# Patient Record
Sex: FEMALE | Race: WHITE | HISPANIC OR LATINO | Employment: OTHER | URBAN - METROPOLITAN AREA
[De-identification: names, ages, dates, MRNs, and addresses within clinical notes are randomized per-mention and may not be internally consistent; named-entity substitution may affect disease eponyms.]

---

## 2017-05-31 ENCOUNTER — HOSPITAL ENCOUNTER (OUTPATIENT)
Dept: RADIOLOGY | Facility: MEDICAL CENTER | Age: 57
End: 2017-05-31
Attending: OBSTETRICS & GYNECOLOGY
Payer: COMMERCIAL

## 2017-05-31 DIAGNOSIS — Z13.820 SCREENING FOR OSTEOPOROSIS: ICD-10-CM

## 2017-05-31 DIAGNOSIS — M85.9 DISORDER OF BONE DENSITY AND STRUCTURE, UNSPECIFIED: ICD-10-CM

## 2017-05-31 PROCEDURE — 77080 DXA BONE DENSITY AXIAL: CPT

## 2017-08-16 ENCOUNTER — HOSPITAL ENCOUNTER (OUTPATIENT)
Dept: RADIOLOGY | Facility: MEDICAL CENTER | Age: 57
End: 2017-08-16
Attending: OBSTETRICS & GYNECOLOGY
Payer: COMMERCIAL

## 2017-08-16 DIAGNOSIS — Z12.31 ENCOUNTER FOR MAMMOGRAM TO ESTABLISH BASELINE MAMMOGRAM: ICD-10-CM

## 2017-08-16 PROCEDURE — 77063 BREAST TOMOSYNTHESIS BI: CPT

## 2017-10-24 ENCOUNTER — OFFICE VISIT (OUTPATIENT)
Dept: MEDICAL GROUP | Age: 57
End: 2017-10-24
Payer: COMMERCIAL

## 2017-10-24 VITALS
DIASTOLIC BLOOD PRESSURE: 64 MMHG | SYSTOLIC BLOOD PRESSURE: 112 MMHG | OXYGEN SATURATION: 97 % | HEART RATE: 70 BPM | TEMPERATURE: 98.4 F | WEIGHT: 163 LBS | RESPIRATION RATE: 12 BRPM | BODY MASS INDEX: 27.83 KG/M2 | HEIGHT: 64 IN

## 2017-10-24 DIAGNOSIS — M77.11 LATERAL EPICONDYLITIS OF RIGHT ELBOW: ICD-10-CM

## 2017-10-24 PROCEDURE — 99214 OFFICE O/P EST MOD 30 MIN: CPT | Performed by: INTERNAL MEDICINE

## 2017-10-24 RX ORDER — NAPROXEN 500 MG/1
500 TABLET ORAL 2 TIMES DAILY WITH MEALS
Qty: 60 TAB | Refills: 0 | Status: SHIPPED | OUTPATIENT
Start: 2017-10-24 | End: 2018-03-12

## 2017-10-24 ASSESSMENT — PAIN SCALES - GENERAL: PAINLEVEL: 5=MODERATE PAIN

## 2017-10-24 ASSESSMENT — PATIENT HEALTH QUESTIONNAIRE - PHQ9: CLINICAL INTERPRETATION OF PHQ2 SCORE: 0

## 2017-10-24 NOTE — LETTER
[facility]  Davina Grimes M.D.  [address]  [place] [address]  Fax: [phone]   Authorization for Release/Disclosure of   Protected Health Information   Name: KENA GOMEZ : 1960 SSN: [id number]   Address: [facility] [place] [address] Phone:    [phone] (home) [phone] (work)   I authorize the entity listed below to release/disclose the PHI below to:   [facility]/Davina Grimes M.D. and Davina Grimes M.D.   Provider or Entity Name:  [facility]      Address   City, State, Zip   Phone:      Fax:     Reason for request: continuity of care   Information to be released:    [x] LAST COLONOSCOPY,  including any PATH REPORT and follow-up  [  ] LAST FIT/COLOGUARD RESULT [  ] LAST DEXA  [  ] LAST MAMMOGRAM  [  ] LAST PAP  [  ] LAST LABS [  ] RETINA EXAM REPORT  [  ] IMMUNIZATION RECORDS  [  ] Release all info      [  ] Check here and initial the line next to each item to release ALL health information INCLUDING  _____ Care and treatment for drug and / or alcohol abuse  _____ HIV testing, infection status, or AIDS  _____ Genetic Testing    DATES OF SERVICE OR TIME PERIOD TO BE DISCLOSED: _____________  I understand and acknowledge that:  * This Authorization may be revoked at any time by you in writing, except if your health information has already been used or disclosed.  * Your health information that will be used or disclosed as a result of you signing this authorization could be re-disclosed by the recipient. If this occurs, your re-disclosed health information may no longer be protected by State or Federal laws.  * You may refuse to sign this Authorization. Your refusal will not affect your ability to obtain treatment.  * This Authorization becomes effective upon signing and will  on (date) __________.      If no date is indicated, this Authorization will  one (1) year from the signature date.    Name: Kena Gomez    Signature:   Date:     10/24/2017            PLEASE FAX REQUESTED RECORDS BACK TO: [phone]

## 2017-10-25 NOTE — PROGRESS NOTES
"Subjective:   Kena Gomez is a 57 y.o. female here today for evaluation and management of:      Lateral epicondylitis of right elbow  This is a new problem. Patient reported that she has pain on lateral part of the right elbow radiated to right forearm for 3 weeks. She does cardio exercise, weight lifting exercise, and pushup up regularly at gym. She did not recall any trauma or injury to right forearm. She tried icy hot and ibuprofen 1-3 caps as needed for sporadically. Her pain improved with ibuprofen but not fully resolved. She reported that her pain is 5 out of 10 in severity, dull, aching in nature.         Current medicines (including changes today)  Current Outpatient Prescriptions   Medication Sig Dispense Refill   • naproxen (NAPROSYN) 500 MG Tab Take 1 Tab by mouth 2 times a day, with meals. 60 Tab 0     No current facility-administered medications for this visit.      She  has a past medical history of Breast cancer (CMS-HCC) and Cancer (CMS-HCC) (2001).    ROS   No chest pain, no shortness of breath, no abdominal pain       Objective:     Blood pressure 112/64, pulse 70, temperature 36.9 °C (98.4 °F), resp. rate 12, height 1.626 m (5' 4\"), weight 73.9 kg (163 lb), SpO2 97 %. Body mass index is 27.98 kg/m².   Physical Exam:  General: Alert, oriented and no acute distress.  Eye contact is good, speech goal directed, affect calm  HEENT: conjunctiva non-injected, sclera non-icteric.  Oral mucous membranes pink and moist with no lesions.  Pinna normal.  Lungs: Normal respiratory effort, clear to auscultation bilaterally with good excursion.  CV: regular rate and rhythm. No murmurs.  Abdomen: soft, non distended, nontender, Bowel sound normal.  Ext: no edema, color normal, vascularity normal, temperature normal  Musculoskeletal exam: Mild tender on palpation of the lateral epicondyles. No joint effusion noticed on both elbows, range of movement of both elbows are within normal.      Assessment and Plan: "   The following treatment plan was discussed     1. Lateral epicondylitis of right elbow  - Discussed the nature of the disease with patient. We discussed to try anti-inflammation for 5-7 days. We discussed to take naproxen 500 mg twice a day with meals for 5-7 days.  - Advised to use elbow splint or brace.  - Advised to ice 20 minutes twice a day.  - Advised to avoid weight lifting exercise and pushup exercise or any repetitive movement of the right elbow until she fully recovers.  - Advised to return to clinic if her symptoms do not improve in 4-6 weeks.  - Consider physical therapy and x-ray in future if symptoms do not improve with conservative treatment.  - Advised to stop taking naproxen if she has stomach discomfort or black tarry stool or bleeding bowel movement.  - Advised to take Prilosec 20 mg daily when she is taking naproxen.  - naproxen (NAPROSYN) 500 MG Tab; Take 1 Tab by mouth 2 times a day, with meals.  Dispense: 60 Tab; Refill: 0      Followup: Return if symptoms worsen or fail to improve.      Please note that this dictation was created using voice recognition software. I have made every reasonable attempt to correct obvious errors, but I expect that there may have unintended errors in text, spelling, punctuation, or grammar that I did not discover.

## 2017-10-25 NOTE — ASSESSMENT & PLAN NOTE
This is a new problem. Patient reported that she has pain on lateral part of the right elbow radiated to right forearm for 3 weeks. She does cardio exercise, weight lifting exercise, and pushup up regularly at gym. She did not recall any trauma or injury to right forearm. She tried icy hot and ibuprofen 1-3 caps as needed for sporadically. Her pain improved with ibuprofen but not fully resolved. She reported that her pain is 5 out of 10 in severity, dull, aching in nature.

## 2018-03-12 ENCOUNTER — OFFICE VISIT (OUTPATIENT)
Dept: MEDICAL GROUP | Age: 58
End: 2018-03-12
Payer: COMMERCIAL

## 2018-03-12 VITALS
HEART RATE: 91 BPM | OXYGEN SATURATION: 95 % | HEIGHT: 64 IN | DIASTOLIC BLOOD PRESSURE: 70 MMHG | BODY MASS INDEX: 28.95 KG/M2 | WEIGHT: 169.6 LBS | SYSTOLIC BLOOD PRESSURE: 110 MMHG | TEMPERATURE: 98.2 F

## 2018-03-12 DIAGNOSIS — F32.A MILD DEPRESSION: ICD-10-CM

## 2018-03-12 DIAGNOSIS — Z23 NEED FOR TDAP VACCINATION: ICD-10-CM

## 2018-03-12 DIAGNOSIS — H61.21 IMPACTED CERUMEN OF RIGHT EAR: ICD-10-CM

## 2018-03-12 DIAGNOSIS — E78.00 PURE HYPERCHOLESTEROLEMIA: ICD-10-CM

## 2018-03-12 DIAGNOSIS — K13.0 SORE LIPS: ICD-10-CM

## 2018-03-12 PROCEDURE — 90715 TDAP VACCINE 7 YRS/> IM: CPT | Performed by: INTERNAL MEDICINE

## 2018-03-12 PROCEDURE — 90471 IMMUNIZATION ADMIN: CPT | Performed by: INTERNAL MEDICINE

## 2018-03-12 PROCEDURE — 99214 OFFICE O/P EST MOD 30 MIN: CPT | Mod: 25 | Performed by: INTERNAL MEDICINE

## 2018-03-12 ASSESSMENT — PATIENT HEALTH QUESTIONNAIRE - PHQ9
5. POOR APPETITE OR OVEREATING: 2 - MORE THAN HALF THE DAYS
CLINICAL INTERPRETATION OF PHQ2 SCORE: 4
SUM OF ALL RESPONSES TO PHQ QUESTIONS 1-9: 16

## 2018-03-13 NOTE — ASSESSMENT & PLAN NOTE
Patient still has depressed mood. She has mild depressed mood for all her life. She has never been treated with medication. She declines suicidal ideation or plan. I will reset ideation or plan. Patient refused to take medication. She agreed to refer to psychologist. She stated that she still enjoys doing her daily activity. 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 She has good relationship with her , friends, family, and coworkers.

## 2018-03-13 NOTE — ASSESSMENT & PLAN NOTE
This is a new problem. Patient reported that she has sore on right lower lip last week. She stated that the sore is similar to pimple. She had swollen on right lower lip last week. She stated that she did not have burning or itchiness. She stated that she had core sore before and patient is not similar to cold sore. Lesion gradually improved and swelling resolved. She does not have any pain or discomfort on exam. She agreed to conservative treatment and continue to monitor. She does not have fever, chills, cough or sore throat or lesion inside oral cavity.

## 2018-03-13 NOTE — ASSESSMENT & PLAN NOTE
Patient reports that she feels full in the right side of the ear as she does not have pain, or discharge from ear. She has history of cerumen impacted and she wants to check her ear in clinic. She also requests to remove her ear wax with saline lavage in clinic.

## 2018-03-13 NOTE — PROGRESS NOTES
Subjective:   Gifty Gomez is a 57 y.o. female here today for evaluation and management of:      Sore lips  This is a new problem. Patient reported that she has sore on right lower lip last week. She stated that the sore is similar to pimple. She had swollen on right lower lip last week. She stated that she did not have burning or itchiness. She stated that she had core sore before and patient is not similar to cold sore. Lesion gradually improved and swelling resolved. She does not have any pain or discomfort on exam. She agreed to conservative treatment and continue to monitor. She does not have fever, chills, cough or sore throat or lesion inside oral cavity.    Pure hypercholesterolemia  Patient has history of high cholesterol. She stated that she has not been compliance with diet and exercise. She started exercising last week. She is advised to continue regular physical exercise at least 3-5 times a week. We also discussed healthy diet as well. Patient wanted to control her cholesterol with diet and exercise and declined to take medication.    Results for GIFTY GOMEZ (MRN [id number]) as of 3/12/2018 20:03   Ref. Range 3/19/2016 07:57   Cholesterol,Tot Latest Ref Range: 100 - 199 mg/dL 286 (H)   Triglycerides Latest Ref Range: 0 - 149 mg/dL 71   HDL Latest Ref Range: >=40 mg/dL 80   LDL Latest Ref Range: <100 mg/dL 192 (H)       Mild depression  Patient still has depressed mood. She has mild depressed mood for all her life. She has never been treated with medication. She declines suicidal ideation or plan. I will reset ideation or plan. Patient refused to take medication. She agreed to refer to psychologist. She stated that she still enjoys doing her daily activity. 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 She has good relationship with her , friends, family, and coworkers.    Impacted cerumen of right ear  Patient reports that she feels full in the right side of the ear as she does  "not have pain, or discharge from ear. She has history of cerumen impacted and she wants to check her ear in clinic. She also requests to remove her ear wax with saline lavage in clinic.         Current medicines (including changes today)  No current outpatient prescriptions on file.     No current facility-administered medications for this visit.      She  has a past medical history of Breast cancer (CMS-HCC) and Cancer (CMS-HCC) (2001).    ROS   No chest pain, no shortness of breath, no abdominal pain       Objective:     Blood pressure 110/70, pulse 91, temperature 36.8 °C (98.2 °F), height 1.626 m (5' 4\"), weight 76.9 kg (169 lb 9.6 oz), SpO2 95 %. Body mass index is 29.11 kg/m².   Physical Exam:  General: Alert, oriented and no acute distress.  Eye contact is good, speech goal directed, affect calm  HEENT: conjunctiva non-injected, sclera non-icteric.  Oral mucous membranes pink and moist with no lesions.  Pinna normal. TM pearly gray. Moderate cerumen in right side of the ER and mild earwax on left ear. No signs of ear infection.   Neck No supraclavicular, submandibular, submental lymphadenopathy or masses in the neck or supraclavicular regions.  Lungs: Normal respiratory effort, clear to auscultation bilaterally with good excursion.  CV: regular rate and rhythm. No murmurs.   Abdomen: soft, non distended, nontender, Bowel sound normal.  Ext: no edema, color normal, vascularity normal, temperature normal      Assessment and Plan:   The following treatment plan was discussed     1. Impacted cerumen of right ear  - Lavage right ear with saline in clinic. There was not much cerumen removed from lavage.  - Patient is advised to try rinsing this with saline and use Flonase nasal spray to decrease pressure and eustachian tube and see that might help to decrease pressure on right ear.  - We also discussed to try nasal steam treatment.  - Patient can try over-the-counter Debrox ear drops to dissolve ear wax.  - She is " advised to return to clinic if her symptoms do not improve with above conservative treatment. Patient understand and agree with the plan.    2. Sore lips  - Improved and swelling resolved. There is no other active oral cavity lesion or lesion on lips.  Provided reassurance.  - Patient agreed to continue to monitor and continue conservative treatment.    3. Pure hypercholesterolemia  - Not on medication. Patient declined to be treated with medication in the past.  - Advised to eat low fat, low carbohydrate and high fiber diet as well as do cardio physical exercise regularly.   - Recheck lab in 3 months.  - COMP METABOLIC PANEL; Future  - LIPID PROFILE; Future    4. Mild depression (CMS-HCC)  - Chronic and stable. Patient refused to take medication. Patient agreed to refer to psychologist for cognitive behavioral treatment.  - REFERRAL TO PSYCHOLOGY  - CBC WITH DIFFERENTIAL; Future  - COMP METABOLIC PANEL; Future    5. Need for Tdap vaccination  - Tdap vaccine was given today after reviewing risks and benefits as well as side effects of vaccine.  - TDAP VACCINE =>8YO IM    Face-to-face time spent 30 minutes with patient and more than half of that time spent for counseling and cooperating of care for medical problems listed above.       Followup: Return in about 6 months (around 9/12/2018), or if symptoms worsen or fail to improve, for hypercholesterolemia, mild depressed, lab review.      Please note that this dictation was created using voice recognition software. I have made every reasonable attempt to correct obvious errors, but I expect that there may have unintended errors in text, spelling, punctuation, or grammar that I did not discover.

## 2018-03-13 NOTE — ASSESSMENT & PLAN NOTE
Patient has history of high cholesterol. She stated that she has not been compliance with diet and exercise. She started exercising last week. She is advised to continue regular physical exercise at least 3-5 times a week. We also discussed healthy diet as well. Patient wanted to control her cholesterol with diet and exercise and declined to take medication.    Results for GIFTY FERREIRA (MRN [id number]) as of 3/12/2018 20:03   Ref. Range 3/19/2016 07:57   Cholesterol,Tot Latest Ref Range: 100 - 199 mg/dL 286 (H)   Triglycerides Latest Ref Range: 0 - 149 mg/dL 71   HDL Latest Ref Range: >=40 mg/dL 80   LDL Latest Ref Range: <100 mg/dL 192 (H)

## 2018-06-01 ENCOUNTER — APPOINTMENT (OUTPATIENT)
Dept: MEDICAL GROUP | Age: 58
End: 2018-06-01
Payer: COMMERCIAL

## 2018-06-09 ENCOUNTER — HOSPITAL ENCOUNTER (OUTPATIENT)
Dept: LAB | Facility: MEDICAL CENTER | Age: 58
End: 2018-06-09
Attending: INTERNAL MEDICINE
Payer: COMMERCIAL

## 2018-06-09 DIAGNOSIS — F32.A MILD DEPRESSION: ICD-10-CM

## 2018-06-09 DIAGNOSIS — E78.00 PURE HYPERCHOLESTEROLEMIA: ICD-10-CM

## 2018-06-09 LAB
ALBUMIN SERPL BCP-MCNC: 3.8 G/DL (ref 3.2–4.9)
ALBUMIN/GLOB SERPL: 1.3 G/DL
ALP SERPL-CCNC: 55 U/L (ref 30–99)
ALT SERPL-CCNC: 11 U/L (ref 2–50)
ANION GAP SERPL CALC-SCNC: 5 MMOL/L (ref 0–11.9)
AST SERPL-CCNC: 12 U/L (ref 12–45)
BASOPHILS # BLD AUTO: 1.3 % (ref 0–1.8)
BASOPHILS # BLD: 0.05 K/UL (ref 0–0.12)
BILIRUB SERPL-MCNC: 1.1 MG/DL (ref 0.1–1.5)
BUN SERPL-MCNC: 18 MG/DL (ref 8–22)
CALCIUM SERPL-MCNC: 8.8 MG/DL (ref 8.5–10.5)
CHLORIDE SERPL-SCNC: 107 MMOL/L (ref 96–112)
CHOLEST SERPL-MCNC: 273 MG/DL (ref 100–199)
CO2 SERPL-SCNC: 27 MMOL/L (ref 20–33)
CREAT SERPL-MCNC: 0.64 MG/DL (ref 0.5–1.4)
EOSINOPHIL # BLD AUTO: 0.14 K/UL (ref 0–0.51)
EOSINOPHIL NFR BLD: 3.6 % (ref 0–6.9)
ERYTHROCYTE [DISTWIDTH] IN BLOOD BY AUTOMATED COUNT: 43.2 FL (ref 35.9–50)
GLOBULIN SER CALC-MCNC: 3 G/DL (ref 1.9–3.5)
GLUCOSE SERPL-MCNC: 94 MG/DL (ref 65–99)
HCT VFR BLD AUTO: 42.7 % (ref 37–47)
HDLC SERPL-MCNC: 61 MG/DL
HGB BLD-MCNC: 13.8 G/DL (ref 12–16)
IMM GRANULOCYTES # BLD AUTO: 0.01 K/UL (ref 0–0.11)
IMM GRANULOCYTES NFR BLD AUTO: 0.3 % (ref 0–0.9)
LDLC SERPL CALC-MCNC: 190 MG/DL
LYMPHOCYTES # BLD AUTO: 1.21 K/UL (ref 1–4.8)
LYMPHOCYTES NFR BLD: 30.9 % (ref 22–41)
MCH RBC QN AUTO: 28.8 PG (ref 27–33)
MCHC RBC AUTO-ENTMCNC: 32.3 G/DL (ref 33.6–35)
MCV RBC AUTO: 89.1 FL (ref 81.4–97.8)
MONOCYTES # BLD AUTO: 0.27 K/UL (ref 0–0.85)
MONOCYTES NFR BLD AUTO: 6.9 % (ref 0–13.4)
NEUTROPHILS # BLD AUTO: 2.24 K/UL (ref 2–7.15)
NEUTROPHILS NFR BLD: 57 % (ref 44–72)
NRBC # BLD AUTO: 0 K/UL
NRBC BLD-RTO: 0 /100 WBC
PLATELET # BLD AUTO: 226 K/UL (ref 164–446)
PMV BLD AUTO: 9.8 FL (ref 9–12.9)
POTASSIUM SERPL-SCNC: 4.2 MMOL/L (ref 3.6–5.5)
PROT SERPL-MCNC: 6.8 G/DL (ref 6–8.2)
RBC # BLD AUTO: 4.79 M/UL (ref 4.2–5.4)
SODIUM SERPL-SCNC: 139 MMOL/L (ref 135–145)
TRIGL SERPL-MCNC: 110 MG/DL (ref 0–149)
WBC # BLD AUTO: 3.9 K/UL (ref 4.8–10.8)

## 2018-06-09 PROCEDURE — 80053 COMPREHEN METABOLIC PANEL: CPT

## 2018-06-09 PROCEDURE — 85025 COMPLETE CBC W/AUTO DIFF WBC: CPT

## 2018-06-09 PROCEDURE — 36415 COLL VENOUS BLD VENIPUNCTURE: CPT

## 2018-06-09 PROCEDURE — 80061 LIPID PANEL: CPT

## 2018-06-18 ENCOUNTER — OFFICE VISIT (OUTPATIENT)
Dept: MEDICAL GROUP | Age: 58
End: 2018-06-18
Payer: COMMERCIAL

## 2018-06-18 VITALS
SYSTOLIC BLOOD PRESSURE: 108 MMHG | HEART RATE: 83 BPM | WEIGHT: 167.4 LBS | HEIGHT: 64 IN | DIASTOLIC BLOOD PRESSURE: 72 MMHG | OXYGEN SATURATION: 95 % | TEMPERATURE: 98 F | BODY MASS INDEX: 28.58 KG/M2

## 2018-06-18 DIAGNOSIS — Z12.31 VISIT FOR SCREENING MAMMOGRAM: ICD-10-CM

## 2018-06-18 DIAGNOSIS — E78.00 PURE HYPERCHOLESTEROLEMIA: ICD-10-CM

## 2018-06-18 DIAGNOSIS — B07.8 OTHER VIRAL WARTS: ICD-10-CM

## 2018-06-18 DIAGNOSIS — F32.A MILD DEPRESSION: ICD-10-CM

## 2018-06-18 PROBLEM — H61.21 IMPACTED CERUMEN OF RIGHT EAR: Status: RESOLVED | Noted: 2018-03-12 | Resolved: 2018-06-18

## 2018-06-18 PROCEDURE — 17110 DESTRUCTION B9 LES UP TO 14: CPT | Performed by: INTERNAL MEDICINE

## 2018-06-18 PROCEDURE — 99214 OFFICE O/P EST MOD 30 MIN: CPT | Mod: 25 | Performed by: INTERNAL MEDICINE

## 2018-06-18 RX ORDER — SIMVASTATIN 10 MG
10 TABLET ORAL EVERY EVENING
Qty: 90 TAB | Refills: 3 | Status: SHIPPED | OUTPATIENT
Start: 2018-06-18 | End: 2018-09-19 | Stop reason: SDUPTHER

## 2018-06-19 NOTE — ASSESSMENT & PLAN NOTE
Patient states that her mood is stable.  She does not want to take any depression medication.  She tries to do regular physical exercise to keep her mood active.  She does not want to see a psychiatrist or psychologist.  She denies suicidal ideation or plan.  She states that she has good relationship with her family, friends and coworkers.

## 2018-06-19 NOTE — ASSESSMENT & PLAN NOTE
Patient reported that she has warts on left palm for about 10 years.  She was treated with topical cream by dermatologist last year without improvement.  She did not recall the name of the cream.  She wants to try cryotherapy.

## 2018-06-19 NOTE — ASSESSMENT & PLAN NOTE
Patient states that she is healthy diet and does regular physical exercise 4 times a week.  Her cholesterol is not well controlled.  Lipid panel showed still has elevated total cholesterol and LDL cholesterol.  She has never been treated with medication for her cholesterol before.  I reviewed the risks and benefits of statin treatment and potential side effects of simvastatin with patient.  Patient agreed to try simvastatin 10 mg every evening.  Patient also reported that she likes to eat cheese and different kind of nuts daily.  We discussed to cut down eating cheese and nuts.      Results for GIFTY FERREIRA (MRN [id number]) as of 6/18/2018 17:41   Ref. Range 3/19/2016 07:57 6/9/2018 08:15   Cholesterol,Tot Latest Ref Range: 100 - 199 mg/dL 286 (H) 273 (H)   Triglycerides Latest Ref Range: 0 - 149 mg/dL 71 110   HDL Latest Ref Range: >=40 mg/dL 80 61   LDL Latest Ref Range: <100 mg/dL 192 (H) 190 (H)

## 2018-08-20 ENCOUNTER — HOSPITAL ENCOUNTER (OUTPATIENT)
Dept: RADIOLOGY | Facility: MEDICAL CENTER | Age: 58
End: 2018-08-20
Attending: INTERNAL MEDICINE
Payer: COMMERCIAL

## 2018-08-20 DIAGNOSIS — Z12.31 VISIT FOR SCREENING MAMMOGRAM: ICD-10-CM

## 2018-08-20 PROCEDURE — 77067 SCR MAMMO BI INCL CAD: CPT

## 2018-09-15 ENCOUNTER — HOSPITAL ENCOUNTER (OUTPATIENT)
Dept: LAB | Facility: MEDICAL CENTER | Age: 58
End: 2018-09-15
Attending: INTERNAL MEDICINE
Payer: COMMERCIAL

## 2018-09-15 DIAGNOSIS — E78.00 PURE HYPERCHOLESTEROLEMIA: ICD-10-CM

## 2018-09-15 DIAGNOSIS — F32.A MILD DEPRESSION: ICD-10-CM

## 2018-09-15 LAB
ALBUMIN SERPL BCP-MCNC: 3.9 G/DL (ref 3.2–4.9)
ALBUMIN/GLOB SERPL: 1.3 G/DL
ALP SERPL-CCNC: 57 U/L (ref 30–99)
ALT SERPL-CCNC: 25 U/L (ref 2–50)
ANION GAP SERPL CALC-SCNC: 9 MMOL/L (ref 0–11.9)
AST SERPL-CCNC: 24 U/L (ref 12–45)
BILIRUB SERPL-MCNC: 1.1 MG/DL (ref 0.1–1.5)
BUN SERPL-MCNC: 17 MG/DL (ref 8–22)
CALCIUM SERPL-MCNC: 8.9 MG/DL (ref 8.5–10.5)
CHLORIDE SERPL-SCNC: 105 MMOL/L (ref 96–112)
CHOLEST SERPL-MCNC: 257 MG/DL (ref 100–199)
CO2 SERPL-SCNC: 27 MMOL/L (ref 20–33)
CREAT SERPL-MCNC: 0.74 MG/DL (ref 0.5–1.4)
FASTING STATUS PATIENT QL REPORTED: NORMAL
GLOBULIN SER CALC-MCNC: 3.1 G/DL (ref 1.9–3.5)
GLUCOSE SERPL-MCNC: 94 MG/DL (ref 65–99)
HDLC SERPL-MCNC: 63 MG/DL
LDLC SERPL CALC-MCNC: 167 MG/DL
POTASSIUM SERPL-SCNC: 4.4 MMOL/L (ref 3.6–5.5)
PROT SERPL-MCNC: 7 G/DL (ref 6–8.2)
SODIUM SERPL-SCNC: 141 MMOL/L (ref 135–145)
TRIGL SERPL-MCNC: 136 MG/DL (ref 0–149)

## 2018-09-15 PROCEDURE — 80053 COMPREHEN METABOLIC PANEL: CPT

## 2018-09-15 PROCEDURE — 36415 COLL VENOUS BLD VENIPUNCTURE: CPT

## 2018-09-15 PROCEDURE — 80061 LIPID PANEL: CPT

## 2018-09-19 ENCOUNTER — OFFICE VISIT (OUTPATIENT)
Dept: MEDICAL GROUP | Age: 58
End: 2018-09-19
Payer: COMMERCIAL

## 2018-09-19 VITALS
HEART RATE: 65 BPM | WEIGHT: 165.4 LBS | HEIGHT: 64 IN | TEMPERATURE: 98.9 F | BODY MASS INDEX: 28.24 KG/M2 | OXYGEN SATURATION: 96 % | DIASTOLIC BLOOD PRESSURE: 82 MMHG | SYSTOLIC BLOOD PRESSURE: 130 MMHG

## 2018-09-19 DIAGNOSIS — Z23 NEEDS FLU SHOT: ICD-10-CM

## 2018-09-19 DIAGNOSIS — F32.A MILD DEPRESSION: ICD-10-CM

## 2018-09-19 DIAGNOSIS — B07.8 OTHER VIRAL WARTS: ICD-10-CM

## 2018-09-19 DIAGNOSIS — E78.00 PURE HYPERCHOLESTEROLEMIA: ICD-10-CM

## 2018-09-19 DIAGNOSIS — D72.819 CHRONIC LEUKOPENIA: ICD-10-CM

## 2018-09-19 PROBLEM — K13.0: Status: RESOLVED | Noted: 2018-03-12 | Resolved: 2018-09-19

## 2018-09-19 PROCEDURE — 90686 IIV4 VACC NO PRSV 0.5 ML IM: CPT | Performed by: INTERNAL MEDICINE

## 2018-09-19 PROCEDURE — 90471 IMMUNIZATION ADMIN: CPT | Performed by: INTERNAL MEDICINE

## 2018-09-19 PROCEDURE — 99214 OFFICE O/P EST MOD 30 MIN: CPT | Mod: 25 | Performed by: INTERNAL MEDICINE

## 2018-09-19 RX ORDER — SIMVASTATIN 20 MG
20 TABLET ORAL EVERY EVENING
Qty: 90 TAB | Refills: 3 | Status: SHIPPED | OUTPATIENT
Start: 2018-09-19 | End: 2019-11-06 | Stop reason: SDUPTHER

## 2018-09-20 NOTE — ASSESSMENT & PLAN NOTE
Patient has low white blood cell count chronically.  Patient denied drinking alcohol heavily or daily.  She reported that she may drink alcohol 1 or 2 glasses of wine once a week at most.  She has history of left breast cancer and was treated with lumpectomy by Dr. Hoover on 10/1/13 followed by radiation therapy for 3 months.  She declined tamoxifen.  Patient was clear from [facility] Plasencia oncologist.  She has normal mammogram on 8/20/18.

## 2018-09-20 NOTE — ASSESSMENT & PLAN NOTE
Patient stated that her mood improved a lot after coming back from vacation.  [place] to [place] with her high school girl friends.  She enjoyed her trip and she felt relaxed.  She will continue to control her mood with nonpharmacological treatment.

## 2018-09-20 NOTE — ASSESSMENT & PLAN NOTE
Patient stated that she took simvastatin 10 mg every evening in the past 3 months and she stopped taking simvastatin for 2 weeks when she went to [place] for vacation.  She just went back from vacation.  She denied side effects from taking simvastatin.  Her cholesterol level improved slightly but not well-controlled yet.  We discussed to increase the dose of simvastatin to 20 mg daily.  Patient agreed with the plan.  Patient reported that she is exercising 5 days a week about 1 hour at gym.    Results for HANNAGIFTY PREET (MRN [id number]) as of 9/19/2018 19:06   Ref. Range 3/19/2016 07:57 6/9/2018 08:15 9/15/2018 07:11   Cholesterol,Tot Latest Ref Range: 100 - 199 mg/dL 286 (H) 273 (H) 257 (H)   Triglycerides Latest Ref Range: 0 - 149 mg/dL 71 110 136   HDL Latest Ref Range: >=40 mg/dL 80 61 63   LDL Latest Ref Range: <100 mg/dL 192 (H) 190 (H) 167 (H)

## 2018-09-20 NOTE — PROGRESS NOTES
Subjective:   Gifty Gomez is a 58 y.o. female here today for evaluation and management of:      Pure hypercholesterolemia  Patient stated that she took simvastatin 10 mg every evening in the past 3 months and she stopped taking simvastatin for 2 weeks when she went to [place] for vacation.  She just went back from vacation.  She denied side effects from taking simvastatin.  Her cholesterol level improved slightly but not well-controlled yet.  We discussed to increase the dose of simvastatin to 20 mg daily.  Patient agreed with the plan.  Patient reported that she is exercising 5 days a week about 1 hour at gym.    Results for GIFTY GOMEZ (MRN [id number]) as of 9/19/2018 19:06   Ref. Range 3/19/2016 07:57 6/9/2018 08:15 9/15/2018 07:11   Cholesterol,Tot Latest Ref Range: 100 - 199 mg/dL 286 (H) 273 (H) 257 (H)   Triglycerides Latest Ref Range: 0 - 149 mg/dL 71 110 136   HDL Latest Ref Range: >=40 mg/dL 80 61 63   LDL Latest Ref Range: <100 mg/dL 192 (H) 190 (H) 167 (H)       Other viral warts on left palm   She was treated with topical cream by dermatologist 10 years ago without improvement.  Patient received cryotherapy on 6/18/18.  The lesion resolved after cryotherapy.  She denied recurrent symptoms or pain.    Mild depression  Patient stated that her mood improved a lot after coming back from vacation.  [place] with her high school girl friends.  She enjoyed her trip and she felt relaxed.  She will continue to control her mood with nonpharmacological treatment.    Chronic leukopenia  Patient has low white blood cell count chronically.  Patient denied drinking alcohol heavily or daily.  She reported that she may drink alcohol 1 or 2 glasses of wine once a week at most.  She has history of left breast cancer and was treated with lumpectomy by Dr. Hoover on 10/1/13 followed by radiation therapy for 3 months.  She declined tamoxifen.  Patient was clear from [facility] Plasencia oncologist.   "She has normal mammogram on 8/20/18.         Current medicines (including changes today)  Current Outpatient Prescriptions   Medication Sig Dispense Refill   • simvastatin (ZOCOR) 20 MG Tab Take 1 Tab by mouth every evening. 90 Tab 3     No current facility-administered medications for this visit.      She  has a past medical history of Breast cancer (HCC) and Cancer (HCC) (2001).    ROS   No chest pain, no shortness of breath, no abdominal pain       Objective:     Blood pressure 130/82, pulse 65, temperature 37.2 °C (98.9 °F), temperature source Temporal, height 1.626 m (5' 4\"), weight 75 kg (165 lb 6.4 oz), SpO2 96 %. Body mass index is 28.39 kg/m².   Physical Exam:  General: Alert, oriented and no acute distress.  Eye contact is good, speech goal directed, affect calm  HEENT: conjunctiva non-injected, sclera non-icteric.  Oral mucous membranes pink and moist with no lesions.  Pinna normal.   Lungs: Normal respiratory effort, clear to auscultation bilaterally with good excursion.  CV: regular rate and rhythm. No murmurs.  Abdomen: soft, non distended, nontender, Bowel sound normal.  Ext: no edema, color normal, vascularity normal, temperature normal      Assessment and Plan:   The following treatment plan was discussed     1. Pure hypercholesterolemia  - Not well-controlled yet.  Increased the dose of simvastatin from 10 mg to 20 mg 1 tablet every evening after discussion with patient. Recheck lab 1-2 weeks before next follow up visit.  - Reviewed the risks and benefits of treatment and potential side effects of medication.  - Advised to eat low fat, low carbohydrate and high fiber diet as well as do cardio physical exercise regularly.  - simvastatin (ZOCOR) 20 MG Tab; Take 1 Tab by mouth every evening.  Dispense: 90 Tab; Refill: 3  - COMP METABOLIC PANEL; Future  - LIPID PROFILE; Future    2. Mild depression (HCC)  - Improved.  Continue to control with nonpharmacological treatment, relaxation techniques and " regular physical exercise.  Continue to monitor.  - CBC WITH DIFFERENTIAL; Future  - COMP METABOLIC PANEL; Future    3. Chronic leukopenia  - Chronic and stable.  Discussed possible causes with patient.  Patient wanted to recheck in 6 month.  - CBC WITH DIFFERENTIAL; Future    4. Other viral warts on left palm   - Resolved after cryotherapy on 6/18/18.    5. Needs flu shot  - Influenza vaccine was given today after reviewing risks and benefits as well as side effects of vaccine.  - Influenza Vaccine Quad Injection >3Y (PF)      Followup: Return in about 6 months (around 3/19/2019), or if symptoms worsen or fail to improve, for Hyperlipidemia, Depression, leukopenia, Lab review.      Please note that this dictation was created using voice recognition software. I have made every reasonable attempt to correct obvious errors, but I expect that there may have unintended errors in text, spelling, punctuation, or grammar that I did not discover.

## 2018-09-20 NOTE — ASSESSMENT & PLAN NOTE
She was treated with topical cream by dermatologist 10 years ago without improvement.  Patient received cryotherapy on 6/18/18.  The lesion resolved after cryotherapy.  She denied recurrent symptoms or pain.

## 2018-12-08 NOTE — PROGRESS NOTES
Discussion/Summary   YOUR LABS ARE STABLE   I SENT TSH TO DR JUDGE        Verified Results  COMP METABOLIC PANEL WITH LIPID PANEL (CPNL,LIPDPL) [id number] 10:03AM ANCA BARRIOS   [Nov 13, 2018 5:16PM ANCA BARRIOS]  OK TSH TO ENDO     Test Name Result Flag Reference   SODIUM 143 mmol/L  135-145   POTASSIUM 4.4 mmol/L  3.4-5.1   CHLORIDE 107 mmol/L     CARBON DIOXIDE 29 mmol/L  21-32   ANION GAP 11 mmol/L  10-20   GLUCOSE 89 mg/dl  65-99   BUN 14 mg/dl  6-20   CREATININE 0.55 mg/dl  0.51-0.95   GFR EST.AFRICAN AMER >90     eGFR results = or >90 mL/min/1.73m2 = Normal kidney function.   GFR EST.NONAFRI AMER >90     eGFR results = or >90 mL/min/1.73m2 = Normal kidney function.   BUN/CREATININE RATIO 25  7-25   BILIRUBIN TOTAL 0.3 mg/dl  0.2-1.0   GOT/AST 23 Units/L  <38   ALKALINE PHOSPHATASE 63 Units/L     ALBUMIN 3.9 g/dl  3.6-5.1   TOTAL PROTEIN 7.3 g/dl  6.4-8.2   GLOBULIN (CALCULATED) 3.4 g/dl  2.0-4.0   A/G RATIO 1.1  1.0-2.4   CALCIUM 9.2 mg/dl  8.4-10.2   GPT/ALT 34 Units/L  <79   FASTING STATUS 11 hrs     CHOLESTEROL 205 mg/dl H <200   Desirable            <200  Borderline High      200 to 239  High                 >=240   HDL CHOLESTEROL 53 mg/dl  >49   Low            <40  Borderline Low 40 to 49  Near Optimal   50 to 59  Optimal        >=60   TRIGLYCERIDES 140 mg/dl  <150   Normal                   <150  Borderline High          150 to 199  High                     200 to 499  Very High                >=500   LDL CHOLESTEROL (CALCULATED) 124 mg/dl  <130   OPTIMAL               <100  NEAR OPTIMAL          100-129  BORDERLINE HIGH       130-159  HIGH                  160-189  VERY HIGH             >=190   NON-HDL CHOLESTEROL 152 mg/dl     Therapeutic Target:  CHD and risk equivalents <130  Multiple risk factors    <160  0 to 1 risk factors      <190   CHOLESTEROL/HDL RATIO 3.9  <4.5   FASTING STATUS 11 hrs          Subjective:   Gifty Gomez is a 57 y.o. female here today for evaluation and management of:      Pure hypercholesterolemia  Patient states that she is healthy diet and does regular physical exercise 4 times a week.  Her cholesterol is not well controlled.  Lipid panel showed still has elevated total cholesterol and LDL cholesterol.  She has never been treated with medication for her cholesterol before.  I reviewed the risks and benefits of statin treatment and potential side effects of simvastatin with patient.  Patient agreed to try simvastatin 10 mg every evening.  Patient also reported that she likes to eat cheese and different kind of nuts daily.  We discussed to cut down eating cheese and nuts.      Results for GIFTY GOMEZ (MRN [id number]) as of 6/18/2018 17:41   Ref. Range 3/19/2016 07:57 6/9/2018 08:15   Cholesterol,Tot Latest Ref Range: 100 - 199 mg/dL 286 (H) 273 (H)   Triglycerides Latest Ref Range: 0 - 149 mg/dL 71 110   HDL Latest Ref Range: >=40 mg/dL 80 61   LDL Latest Ref Range: <100 mg/dL 192 (H) 190 (H)         Other viral warts on left palm   Patient reported that she has warts on left palm for about 10 years.  She was treated with topical cream by dermatologist last year without improvement.  She did not recall the name of the cream.  She wants to try cryotherapy.    Mild depression  Patient states that her mood is stable.  She does not want to take any depression medication.  She tries to do regular physical exercise to keep her mood active.  She does not want to see a psychiatrist or psychologist.  She denies suicidal ideation or plan.  She states that she has good relationship with her family, friends and coworkers.         Current medicines (including changes today)  Current Outpatient Prescriptions   Medication Sig Dispense Refill   • simvastatin (ZOCOR) 10 MG Tab Take 1 Tab by mouth every evening. 90 Tab 3     No current facility-administered medications for this visit.      She  has  "a past medical history of Breast cancer (HCC) and Cancer (HCC) (2001).    ROS   No chest pain, no shortness of breath, no abdominal pain       Objective:     Blood pressure 108/72, pulse 83, temperature 36.7 °C (98 °F), height 1.626 m (5' 4\"), weight 75.9 kg (167 lb 6.4 oz), SpO2 95 %. Body mass index is 28.73 kg/m².   Physical Exam:  General: Alert, oriented and no acute distress.  Eye contact is good, speech goal directed, affect calm  HEENT: conjunctiva non-injected, sclera non-icteric.  Oral mucous membranes pink and moist with no lesions.  Pinna normal.   Lungs: Normal respiratory effort, clear to auscultation bilaterally with good excursion.  CV: regular rate and rhythm. No murmurs.   Abdomen: soft, non distended, nontender, Bowel sound normal.  Ext: no edema, color normal, vascularity normal, temperature normal    CRYOTHERAPY:  Discussed risks and benefits of cryotherapy. Patient verbally agreed. 20 applications of cryotherapy was applied to 4 lesion on left palm. Patient tolerated procedure well. Aftercare instructions given.        Assessment and Plan:   The following treatment plan was discussed     1. Pure hypercholesterolemia  - Not well controlled with diet and physical exercise.  Review potential side effects and risks and benefits of simvastatin with patient.  - Patient agreed to try simvastatin 10 mg 1 tablet every evening.  - Advised to eat low fat, low carbohydrate and high fiber diet as well as do cardio physical exercise regularly.  - simvastatin (ZOCOR) 10 MG Tab; Take 1 Tab by mouth every evening.  Dispense: 90 Tab; Refill: 3  - COMP METABOLIC PANEL; Future  - LIPID PROFILE; Future    2. Mild depression (HCC)  - Chronic and stable.  Patient declined to take medication declined to see psychiatrist or psychologist.  She states that her mood is improving with physical exercise.  She denied suicidal ideation or plan or homicidal ideation and plan.  - COMP METABOLIC PANEL; Future    3. Other viral " warts on left palm   - Provided cryo treatment and discussed the nature of the disease.  Aftercare instruction was given.  Advised to keep the lesion dry and clean.    4. Visit for screening mammogram  - Counseling for breast cancer screening.  Ordered screening mammogram.  Patient will call to schedule for mammogram on or after 8/16/18.  - MA-SCREEN MAMMO W/CAD-BILAT; Future      Followup: Return in about 3 months (around 9/18/2018), or if symptoms worsen or fail to improve, for Hyperlipidemia, Depression, warts on left palm, Lab review.      Please note that this dictation was created using voice recognition software. I have made every reasonable attempt to correct obvious errors, but I expect that there may have unintended errors in text, spelling, punctuation, or grammar that I did not discover.

## 2019-01-02 ENCOUNTER — OFFICE VISIT (OUTPATIENT)
Dept: MEDICAL GROUP | Age: 59
End: 2019-01-02
Payer: COMMERCIAL

## 2019-01-02 ENCOUNTER — HOSPITAL ENCOUNTER (OUTPATIENT)
Dept: RADIOLOGY | Facility: MEDICAL CENTER | Age: 59
End: 2019-01-02
Attending: FAMILY MEDICINE
Payer: COMMERCIAL

## 2019-01-02 VITALS
OXYGEN SATURATION: 95 % | WEIGHT: 161 LBS | DIASTOLIC BLOOD PRESSURE: 84 MMHG | SYSTOLIC BLOOD PRESSURE: 120 MMHG | HEIGHT: 64 IN | TEMPERATURE: 97.4 F | BODY MASS INDEX: 27.49 KG/M2 | HEART RATE: 88 BPM
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
## 2019-01-02 PROCEDURE — 73502 X-RAY EXAM HIP UNI 2-3 VIEWS: CPT | Mod: LT

## 2019-01-02 PROCEDURE — 71101 X-RAY EXAM UNILAT RIBS/CHEST: CPT | Mod: LT

## 2019-01-02 PROCEDURE — 73060 X-RAY EXAM OF HUMERUS: CPT | Mod: LT

## 2019-01-02 PROCEDURE — 99214 OFFICE O/P EST MOD 30 MIN: CPT | Performed by: FAMILY MEDICINE

## 2019-01-02 NOTE — LETTER
January 2, 2019        Re: Kena Gomez    To whom it may concern,    My name is Sarah Chao MD. I am taking care of Kena Gomez, who is suffering acute illness that requires treatment. Please excuse Kena Gomez from working duties from 01/02/19 to 01/03/2019. She can safely return to work on 1/4/2019.     If you have any questions, please do not hesitate to call my office.     Best regards,                      Sarah Chao
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
## 2019-04-07 LAB
ALBUMIN SERPL-MCNC: 4.2 G/DL (ref 3.5–5.5)
ALBUMIN/GLOB SERPL: 1.4 {RATIO} (ref 1.2–2.2)
ALP SERPL-CCNC: 73 IU/L (ref 39–117)
ALT SERPL-CCNC: 9 IU/L (ref 0–32)
AST SERPL-CCNC: 17 IU/L (ref 0–40)
BASOPHILS # BLD AUTO: 0.1 X10E3/UL (ref 0–0.2)
BASOPHILS NFR BLD AUTO: 1 %
BILIRUB SERPL-MCNC: 1.4 MG/DL (ref 0–1.2)
BUN SERPL-MCNC: 13 MG/DL (ref 6–24)
BUN/CREAT SERPL: 15 (ref 9–23)
CALCIUM SERPL-MCNC: 9.2 MG/DL (ref 8.7–10.2)
CHLORIDE SERPL-SCNC: 105 MMOL/L (ref 96–106)
CHOLEST SERPL-MCNC: 223 MG/DL (ref 100–199)
CO2 SERPL-SCNC: 24 MMOL/L (ref 20–29)
CREAT SERPL-MCNC: 0.88 MG/DL (ref 0.57–1)
EOSINOPHIL # BLD AUTO: 0.2 X10E3/UL (ref 0–0.4)
EOSINOPHIL NFR BLD AUTO: 4 %
ERYTHROCYTE [DISTWIDTH] IN BLOOD BY AUTOMATED COUNT: 13.8 % (ref 12.3–15.4)
GLOBULIN SER CALC-MCNC: 3 G/DL (ref 1.5–4.5)
GLUCOSE SERPL-MCNC: 103 MG/DL (ref 65–99)
HCT VFR BLD AUTO: 43.3 % (ref 34–46.6)
HDLC SERPL-MCNC: 68 MG/DL
HGB BLD-MCNC: 14 G/DL (ref 11.1–15.9)
IMM GRANULOCYTES # BLD AUTO: 0 X10E3/UL (ref 0–0.1)
IMM GRANULOCYTES NFR BLD AUTO: 0 %
IMMATURE CELLS  115398: NORMAL
LABORATORY COMMENT REPORT: ABNORMAL
LDLC SERPL CALC-MCNC: 132 MG/DL (ref 0–99)
LYMPHOCYTES # BLD AUTO: 1.5 X10E3/UL (ref 0.7–3.1)
LYMPHOCYTES NFR BLD AUTO: 29 %
MCH RBC QN AUTO: 28.3 PG (ref 26.6–33)
MCHC RBC AUTO-ENTMCNC: 32.3 G/DL (ref 31.5–35.7)
MCV RBC AUTO: 88 FL (ref 79–97)
MONOCYTES # BLD AUTO: 0.3 X10E3/UL (ref 0.1–0.9)
MONOCYTES NFR BLD AUTO: 6 %
MORPHOLOGY BLD-IMP: NORMAL
NEUTROPHILS # BLD AUTO: 3.1 X10E3/UL (ref 1.4–7)
NEUTROPHILS NFR BLD AUTO: 60 %
NRBC BLD AUTO-RTO: NORMAL %
PLATELET # BLD AUTO: 256 X10E3/UL (ref 150–379)
POTASSIUM SERPL-SCNC: 4.6 MMOL/L (ref 3.5–5.2)
PROT SERPL-MCNC: 7.2 G/DL (ref 6–8.5)
RBC # BLD AUTO: 4.95 X10E6/UL (ref 3.77–5.28)
SODIUM SERPL-SCNC: 142 MMOL/L (ref 134–144)
TRIGL SERPL-MCNC: 117 MG/DL (ref 0–149)
VLDLC SERPL CALC-MCNC: 23 MG/DL (ref 5–40)
WBC # BLD AUTO: 5.3 X10E3/UL (ref 3.4–10.8)

## 2019-04-10 ENCOUNTER — OFFICE VISIT (OUTPATIENT)
Dept: MEDICAL GROUP | Age: 59
End: 2019-04-10
Payer: COMMERCIAL

## 2019-04-10 VITALS
HEIGHT: 64 IN | DIASTOLIC BLOOD PRESSURE: 62 MMHG | BODY MASS INDEX: 27.08 KG/M2 | OXYGEN SATURATION: 96 % | SYSTOLIC BLOOD PRESSURE: 108 MMHG | WEIGHT: 158.6 LBS | HEART RATE: 63 BPM | TEMPERATURE: 98.7 F

## 2019-04-10 DIAGNOSIS — F32.A MILD DEPRESSION: ICD-10-CM

## 2019-04-10 DIAGNOSIS — E78.00 PURE HYPERCHOLESTEROLEMIA: ICD-10-CM

## 2019-04-10 DIAGNOSIS — R73.01 IFG (IMPAIRED FASTING GLUCOSE): ICD-10-CM

## 2019-04-10 DIAGNOSIS — D72.819 CHRONIC LEUKOPENIA: ICD-10-CM

## 2019-04-10 PROCEDURE — 99214 OFFICE O/P EST MOD 30 MIN: CPT | Performed by: INTERNAL MEDICINE

## 2019-04-10 ASSESSMENT — PATIENT HEALTH QUESTIONNAIRE - PHQ9
1. LITTLE INTEREST OR PLEASURE IN DOING THINGS: NOT AT ALL
5. POOR APPETITE OR OVEREATING: NOT AT ALL
SUM OF ALL RESPONSES TO PHQ9 QUESTIONS 1 AND 2: 0
SUM OF ALL RESPONSES TO PHQ QUESTIONS 1-9: 4
8. MOVING OR SPEAKING SO SLOWLY THAT OTHER PEOPLE COULD HAVE NOTICED. OR THE OPPOSITE, BEING SO FIGETY OR RESTLESS THAT YOU HAVE BEEN MOVING AROUND A LOT MORE THAN USUAL: NOT AT ALL
9. THOUGHTS THAT YOU WOULD BE BETTER OFF DEAD, OR OF HURTING YOURSELF: NOT AT ALL
7. TROUBLE CONCENTRATING ON THINGS, SUCH AS READING THE NEWSPAPER OR WATCHING TELEVISION: SEVERAL DAYS
2. FEELING DOWN, DEPRESSED, IRRITABLE, OR HOPELESS: NOT AT ALL
4. FEELING TIRED OR HAVING LITTLE ENERGY: SEVERAL DAYS
6. FEELING BAD ABOUT YOURSELF - OR THAT YOU ARE A FAILURE OR HAVE LET YOURSELF OR YOUR FAMILY DOWN: NOT AL ALL
3. TROUBLE FALLING OR STAYING ASLEEP OR SLEEPING TOO MUCH: MORE THAN HALF THE DAYS

## 2019-04-10 ASSESSMENT — PAIN SCALES - GENERAL: PAINLEVEL: NO PAIN

## 2019-04-10 NOTE — PROGRESS NOTES
Subjective:   Gifty Gomez is a 58 y.o. female here today for evaluation and management of:    Pure hypercholesterolemia  Chronic. On 9/19/18 I increased her dose to simvastatin 20 mg QD from 10 mg QD, but patient states she has been forgetting to take the medication, taking approximately 3 times weekly. She denies any side effects from this medication. I did review and discuss blood work with the patient in clinic today, and informed her that her cholesterol levels were improved, but still elevated. Goal of total cholesterol <200 and LDL <100. If she continues to be noncompliant with medication, may change to higher potency medication in the future.    Results for GIFTY GOMEZ (MRN [id number]) as of 4/10/2019 16:32   Ref. Range 6/9/2018 08:15 9/15/2018 07:11 4/6/2019 07:13   Cholesterol,Tot Latest Ref Range: 100 - 199 mg/dL 273 (H) 257 (H) 223 (H)   Triglycerides Latest Ref Range: 0 - 149 mg/dL 110 136 117   HDL Latest Ref Range: >39 mg/dL 61 63 68   LDL Latest Ref Range: 0 - 99 mg/dL 190 (H) 167 (H) 132 (H)   VLDL Cholesterol Calc Latest Ref Range: 5 - 40 mg/dL   23       Mild depression (HCC)  Chronic, nonpharmalogical treatment. She states her mood has been stable and denies any suicidal ideation. She will plan to continue with relaxation techniques and regular exercise.    Chronic leukopenia  Chronic. Patient has low white blood cell count chronically and I did review and discuss recent blood work with the patient in clinic today. She denies onset of any new symptoms.  Her recent CBC showed WBC and differential count are back to normal.    Results for GIFTY GOMEZ (MRN [id number]) as of 4/10/2019 16:49   Ref. Range 4/6/2019 07:13   WBC Latest Ref Range: 3.4 - 10.8 x10E3/uL 5.3   RBC Latest Ref Range: 3.77 - 5.28 x10E6/uL 4.95   Hemoglobin Latest Ref Range: 11.1 - 15.9 g/dL 14.0   Hematocrit Latest Ref Range: 34.0 - 46.6 % 43.3   MCV Latest Ref Range: 79 - 97 fL 88   MCH Latest Ref Range: 26.6 - 33.0 pg  "28.3   MCHC Latest Ref Range: 31.5 - 35.7 g/dL 32.3   RDW Latest Ref Range: 12.3 - 15.4 % 13.8   Platelet Count Latest Ref Range: 150 - 379 x10E3/uL 256     Results for GIFTY FERREIRA (MRN [id number]) as of 4/10/2019 16:32   Ref. Range 4/6/2019 07:13   Neutrophils-Polys Latest Ref Range: Not Estab. % 60   Neutrophils (Absolute) Latest Ref Range: 1.4 - 7.0 x10E3/uL 3.1   Lymphocytes Latest Ref Range: Not Estab. % 29   Lymphs (Absolute) Latest Ref Range: 0.7 - 3.1 x10E3/uL 1.5   Monocytes Latest Ref Range: Not Estab. % 6   Monos (Absolute) Latest Ref Range: 0.1 - 0.9 x10E3/uL 0.3   Eosinophils Latest Ref Range: Not Estab. % 4   Eos (Absolute) Latest Ref Range: 0.0 - 0.4 x10E3/uL 0.2   Basophils Latest Ref Range: Not Estab. % 1   Baso (Absolute) Latest Ref Range: 0.0 - 0.2 x10E3/uL 0.1   Immature Granulocytes Latest Ref Range: Not Estab. % 0   Immature Granulocytes (abs) Latest Ref Range: 0.0 - 0.1 x10E3/uL 0.0       IFG (impaired fasting glucose)  Chronic and unmedicated. Olivia states that due to recent life events, she has not been exercising as regularly or eating a balanced diet. She would like to try additional lifestyle modifications prior to medication initiation, and will plan to recheck in 6 months.           Current medicines (including changes today)  Current Outpatient Prescriptions   Medication Sig Dispense Refill   • simvastatin (ZOCOR) 20 MG Tab Take 1 Tab by mouth every evening. 90 Tab 3     No current facility-administered medications for this visit.      She  has a past medical history of Breast cancer (HCC) and Cancer (HCC) (2001).    ROS   No chest pain, no shortness of breath, no abdominal pain, no le edema       Objective:     /62 (BP Location: Right arm, Patient Position: Sitting, BP Cuff Size: Adult)   Pulse 63   Temp 37.1 °C (98.7 °F) (Temporal)   Ht 1.626 m (5' 4\")   Wt 71.9 kg (158 lb 9.6 oz)   SpO2 96%  Body mass index is 27.22 kg/m².   Physical Exam:  General: Alert, oriented " and no acute distress.  Eye contact is good, speech goal directed, affect calm  HEENT: conjunctiva non-injected, sclera non-icteric.  Oral mucous membranes pink and moist with no lesions.  Pinna normal. TM pearly gray, cerumen bilateral without impaction.   Neck No supraclavicular, submandibular, submental lymphadenopathy or masses in the neck or supraclavicular regions.  Lungs: Normal respiratory effort, clear to auscultation bilaterally with good excursion.  CV: regular rate and rhythm. No murmurs. No carotid bruits.  Abdomen: soft, non distended, nontender, Bowel sound normal.  Ext: no edema, color normal, vascularity normal, temperature normal      Assessment and Plan:   The following treatment plan was discussed     1. Pure hypercholesterolemia  - Not at goal, patient suppposed to be taking simvastatin 20 mg every evening. Discussed treatment plan with patient, as she has been forgetting to take simvastatin 20 mg nightly as prescribed and instead has been taking 3x weekly. We discussed that she should be setting a reminder to take this medication and that we may need to change to a medication with a higher potency in the future if her cholesterol remains high. Goal of cholesterol <200, LDL <100.   - Comp Metabolic Panel; Future  - Lipid Profile; Future    2. Mild depression (HCC)  - Stable. Continue to control with nonpharmacological treatments including relaxation techniques and regular physical exercise. Continue to monitor.  - Patient reported that she plans to go vacation trip with her .    3. Chronic leukopenia  - Chronic and stable. Discussed possible causes with patient. Patient wanted to recheck in 6 month.    4. IFG (impaired fasting glucose)  - Not controlled with medication at this time, plan on lifestyle modification at patient's request.  - Advised to eat low fat, low carbohydrate and high fiber diet as well as do cardio physical exercise regularly.   - Comp Metabolic Panel; Future  -  HEMOGLOBIN A1C; Future      Health Maintenance reviewed.  Patient is due for Shingrix vaccine and she will be placed on waiting list.      Follow up: Return in about 6 months (around 10/10/2019), or if symptoms worsen or fail to improve, for Hyperlipidemia, Depression, Impaired fasting glucose, Lab review.      IGenia (Scribe), am scribing for, and in the presence of, Davina Grimes M.D..    Electronically signed by: Genia Tavares (Isauroibe), 4/10/2019    I, Davina Grimes M.D., personally performed the services described in this documentation, as scribed by Genia Tavares in my presence, and it is both accurate and complete.

## 2019-09-06 ENCOUNTER — HOSPITAL ENCOUNTER (OUTPATIENT)
Dept: RADIOLOGY | Facility: MEDICAL CENTER | Age: 59
End: 2019-09-06
Attending: INTERNAL MEDICINE
Payer: COMMERCIAL

## 2019-09-06 DIAGNOSIS — Z12.31 VISIT FOR SCREENING MAMMOGRAM: ICD-10-CM

## 2019-09-06 PROCEDURE — 77063 BREAST TOMOSYNTHESIS BI: CPT

## 2019-09-09 DIAGNOSIS — R92.8 ABNORMAL MAMMOGRAM OF RIGHT BREAST: ICD-10-CM

## 2019-09-10 NOTE — PROGRESS NOTES
Patient has focal asymmetry on upper outer quadrant of the right breast form on her recent screening mammogram done on 9/9/2019.  Radiologist recommend to do diagnostic mammogram of right breast in right breast ultrasound.  The orders are placed today.    I routed mammogram result note to medical assistant to inform patient to do additional tests.  I also sent result note to patient through my chart to inform her recent screening mammogram report and advise her to call to schedule for right breast ultrasound and right diagnostic mammogram.    Davina Grimes M.D.

## 2019-09-11 ENCOUNTER — APPOINTMENT (OUTPATIENT)
Dept: RADIOLOGY | Facility: MEDICAL CENTER | Age: 59
End: 2019-09-11
Attending: INTERNAL MEDICINE
Payer: COMMERCIAL

## 2019-09-13 ENCOUNTER — HOSPITAL ENCOUNTER (OUTPATIENT)
Dept: RADIOLOGY | Facility: MEDICAL CENTER | Age: 59
End: 2019-09-13
Attending: INTERNAL MEDICINE
Payer: COMMERCIAL

## 2019-09-13 DIAGNOSIS — R92.8 ABNORMAL MAMMOGRAM OF RIGHT BREAST: ICD-10-CM

## 2019-09-13 DIAGNOSIS — R92.8 ABNORMAL MAMMOGRAM: ICD-10-CM

## 2019-09-13 PROCEDURE — 76642 ULTRASOUND BREAST LIMITED: CPT | Mod: RT

## 2019-09-13 PROCEDURE — G0279 TOMOSYNTHESIS, MAMMO: HCPCS | Mod: RT

## 2019-09-13 NOTE — PROGRESS NOTES
The recent diagnostic mammogram and ultrasound of right breast showed you have a 5.3 mm complicated cyst. There is no solid tumor per radiologist's reading. I placed a right breast ultrasound to be done in 6 months today. Patient was informed via my chart with result note on 9/13/19.    Davina Grimes M.D.

## 2019-10-05 ENCOUNTER — HOSPITAL ENCOUNTER (OUTPATIENT)
Dept: LAB | Facility: MEDICAL CENTER | Age: 59
End: 2019-10-05
Attending: INTERNAL MEDICINE
Payer: COMMERCIAL

## 2019-10-05 DIAGNOSIS — R73.01 IFG (IMPAIRED FASTING GLUCOSE): ICD-10-CM

## 2019-10-05 DIAGNOSIS — E78.00 PURE HYPERCHOLESTEROLEMIA: ICD-10-CM

## 2019-10-05 LAB
ALBUMIN SERPL BCP-MCNC: 4.3 G/DL (ref 3.2–4.9)
ALBUMIN/GLOB SERPL: 1.3 G/DL
ALP SERPL-CCNC: 62 U/L (ref 30–99)
ALT SERPL-CCNC: 10 U/L (ref 2–50)
ANION GAP SERPL CALC-SCNC: 7 MMOL/L (ref 0–11.9)
AST SERPL-CCNC: 17 U/L (ref 12–45)
BILIRUB SERPL-MCNC: 1.8 MG/DL (ref 0.1–1.5)
BUN SERPL-MCNC: 14 MG/DL (ref 8–22)
CALCIUM SERPL-MCNC: 9.3 MG/DL (ref 8.5–10.5)
CHLORIDE SERPL-SCNC: 107 MMOL/L (ref 96–112)
CHOLEST SERPL-MCNC: 286 MG/DL (ref 100–199)
CO2 SERPL-SCNC: 28 MMOL/L (ref 20–33)
CREAT SERPL-MCNC: 0.79 MG/DL (ref 0.5–1.4)
EST. AVERAGE GLUCOSE BLD GHB EST-MCNC: 117 MG/DL
FASTING STATUS PATIENT QL REPORTED: NORMAL
GLOBULIN SER CALC-MCNC: 3.2 G/DL (ref 1.9–3.5)
GLUCOSE SERPL-MCNC: 95 MG/DL (ref 65–99)
HBA1C MFR BLD: 5.7 % (ref 0–5.6)
HDLC SERPL-MCNC: 70 MG/DL
LDLC SERPL CALC-MCNC: 192 MG/DL
POTASSIUM SERPL-SCNC: 4.5 MMOL/L (ref 3.6–5.5)
PROT SERPL-MCNC: 7.5 G/DL (ref 6–8.2)
SODIUM SERPL-SCNC: 142 MMOL/L (ref 135–145)
TRIGL SERPL-MCNC: 118 MG/DL (ref 0–149)

## 2019-10-05 PROCEDURE — 83036 HEMOGLOBIN GLYCOSYLATED A1C: CPT

## 2019-10-05 PROCEDURE — 36415 COLL VENOUS BLD VENIPUNCTURE: CPT

## 2019-10-05 PROCEDURE — 80061 LIPID PANEL: CPT

## 2019-10-05 PROCEDURE — 80053 COMPREHEN METABOLIC PANEL: CPT

## 2019-10-15 ENCOUNTER — OFFICE VISIT (OUTPATIENT)
Dept: MEDICAL GROUP | Age: 59
End: 2019-10-15
Payer: COMMERCIAL

## 2019-10-15 VITALS
SYSTOLIC BLOOD PRESSURE: 106 MMHG | HEIGHT: 64 IN | DIASTOLIC BLOOD PRESSURE: 64 MMHG | WEIGHT: 159.6 LBS | BODY MASS INDEX: 27.25 KG/M2 | HEART RATE: 72 BPM | TEMPERATURE: 98.6 F | OXYGEN SATURATION: 95 %

## 2019-10-15 DIAGNOSIS — R73.01 IFG (IMPAIRED FASTING GLUCOSE): ICD-10-CM

## 2019-10-15 DIAGNOSIS — Z11.59 NEED FOR HEPATITIS C SCREENING TEST: ICD-10-CM

## 2019-10-15 DIAGNOSIS — E78.00 PURE HYPERCHOLESTEROLEMIA: ICD-10-CM

## 2019-10-15 DIAGNOSIS — M72.2 PLANTAR FASCIITIS, BILATERAL: ICD-10-CM

## 2019-10-15 PROCEDURE — 99214 OFFICE O/P EST MOD 30 MIN: CPT | Performed by: INTERNAL MEDICINE

## 2019-10-15 SDOH — HEALTH STABILITY: MENTAL HEALTH: HOW OFTEN DO YOU HAVE A DRINK CONTAINING ALCOHOL?: 2-4 TIMES A MONTH

## 2019-10-15 SDOH — HEALTH STABILITY: MENTAL HEALTH: HOW MANY STANDARD DRINKS CONTAINING ALCOHOL DO YOU HAVE ON A TYPICAL DAY?: 1 OR 2

## 2019-10-15 SDOH — HEALTH STABILITY: MENTAL HEALTH: HOW OFTEN DO YOU HAVE 6 OR MORE DRINKS ON ONE OCCASION?: NEVER

## 2019-10-15 ASSESSMENT — PAIN SCALES - GENERAL: PAINLEVEL: 5=MODERATE PAIN
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
## 2019-10-15 NOTE — PATIENT INSTRUCTIONS
Plantar Fasciitis  Plantar fasciitis is a painful foot condition that affects the heel. It occurs when the band of tissue that connects the toes to the heel bone (plantar fascia) becomes irritated. This can happen after exercising too much or doing other repetitive activities (overuse injury). The pain from plantar fasciitis can range from mild irritation to severe pain that makes it difficult for you to walk or move. The pain is usually worse in the morning or after you have been sitting or lying down for a while.  CAUSES  This condition may be caused by:  · Standing for long periods of time.  · Wearing shoes that do not fit.  · Doing high-impact activities, including running, aerobics, and ballet.  · Being overweight.  · Having an abnormal way of walking (gait).  · Having tight calf muscles.  · Having high arches in your feet.  · Starting a new athletic activity.  SYMPTOMS  The main symptom of this condition is heel pain. Other symptoms include:  · Pain that gets worse after activity or exercise.  · Pain that is worse in the morning or after resting.  · Pain that goes away after you walk for a few minutes.  DIAGNOSIS  This condition may be diagnosed based on your signs and symptoms. Your health care provider will also do a physical exam to check for:  · A tender area on the bottom of your foot.  · A high arch in your foot.  · Pain when you move your foot.  · Difficulty moving your foot.  You may also need to have imaging studies to confirm the diagnosis. These can include:  · X-rays.  · Ultrasound.  · MRI.  TREATMENT   Treatment for plantar fasciitis depends on the severity of the condition. Your treatment may include:  · Rest, ice, and over-the-counter pain medicines to manage your pain.  · Exercises to stretch your calves and your plantar fascia.  · A splint that holds your foot in a stretched, upward position while you sleep (night splint).  · Physical therapy to relieve symptoms and prevent problems in the  future.  · Cortisone injections to relieve severe pain.  · Extracorporeal shock wave therapy (ESWT) to stimulate damaged plantar fascia with electrical impulses. It is often used as a last resort before surgery.  · Surgery, if other treatments have not worked after 12 months.  HOME CARE INSTRUCTIONS  · Take medicines only as directed by your health care provider.  · Avoid activities that cause pain.  · Roll the bottom of your foot over a bag of ice or a bottle of cold water. Do this for 20 minutes, 3-4 times a day.  · Perform simple stretches as directed by your health care provider.  · Try wearing athletic shoes with air-sole or gel-sole cushions or soft shoe inserts.  · Wear a night splint while sleeping, if directed by your health care provider.  · Keep all follow-up appointments with your health care provider.  PREVENTION   · Do not perform exercises or activities that cause heel pain.  · Consider finding low-impact activities if you continue to have problems.  · Lose weight if you need to.  The best way to prevent plantar fasciitis is to avoid the activities that aggravate your plantar fascia.  SEEK MEDICAL CARE IF:  · Your symptoms do not go away after treatment with home care measures.  · Your pain gets worse.  · Your pain affects your ability to move or do your daily activities.  This information is not intended to replace advice given to you by your health care provider. Make sure you discuss any questions you have with your health care provider.  Document Released: 09/12/2002 Document Revised: 04/10/2017 Document Reviewed: 10/28/2015  [employer] Patient Education © 2017 [employer]

## 2019-11-06 ENCOUNTER — HOSPITAL ENCOUNTER (OUTPATIENT)
Facility: MEDICAL CENTER | Age: 59
End: 2019-11-06
Attending: INTERNAL MEDICINE
Payer: COMMERCIAL

## 2019-11-06 ENCOUNTER — OFFICE VISIT (OUTPATIENT)
Dept: MEDICAL GROUP | Age: 59
End: 2019-11-06
Payer: COMMERCIAL

## 2019-11-06 VITALS
TEMPERATURE: 98.4 F | WEIGHT: 161 LBS | DIASTOLIC BLOOD PRESSURE: 54 MMHG | HEIGHT: 63 IN | HEART RATE: 80 BPM | SYSTOLIC BLOOD PRESSURE: 102 MMHG | BODY MASS INDEX: 28.53 KG/M2 | OXYGEN SATURATION: 95 %

## 2019-11-06 DIAGNOSIS — E78.00 PURE HYPERCHOLESTEROLEMIA: ICD-10-CM

## 2019-11-06 DIAGNOSIS — Z12.4 SCREENING FOR CERVICAL CANCER: ICD-10-CM

## 2019-11-06 DIAGNOSIS — Z01.419 ENCOUNTER FOR GYNECOLOGICAL EXAMINATION: ICD-10-CM

## 2019-11-06 PROCEDURE — 87624 HPV HI-RISK TYP POOLED RSLT: CPT

## 2019-11-06 PROCEDURE — 88175 CYTOPATH C/V AUTO FLUID REDO: CPT

## 2019-11-06 PROCEDURE — 99396 PREV VISIT EST AGE 40-64: CPT | Performed by: INTERNAL MEDICINE

## 2019-11-06 RX ORDER — SIMVASTATIN 20 MG
TABLET ORAL
Qty: 90 TAB | Refills: 3 | Status: SHIPPED | OUTPATIENT
Start: 2019-11-06 | End: 2020-11-09

## 2019-11-06 SDOH — HEALTH STABILITY: MENTAL HEALTH: HOW OFTEN DO YOU HAVE A DRINK CONTAINING ALCOHOL?: MONTHLY OR LESS

## 2019-11-06 ASSESSMENT — PAIN SCALES - GENERAL: PAINLEVEL: NO PAIN

## 2019-11-06 NOTE — PROGRESS NOTES
SUBJECTIVE: 59 y.o. female for annual routine gynecologic exam  Chief Complaint   Patient presents with   • Gynecologic Exam     pap       OB History   No obstetric history on file.      Last Pap: 4/26/16  Social History     Substance and Sexual Activity   Sexual Activity Not Currently   • Partners: Male     Sexual history: currently sexually active, single partner   H/O Abnormal Pap no  She  reports that she has never smoked. She has never used smokeless tobacco.        Allergies: Patient has no known allergies.     ROS:    Reports none menopause symptoms of hot flashes, night sweats, sleep disruption, mood changes.Reports vaginal dryness.   No significant bloating/fluid retention, pelvic pain, or dyspareunia. No vaginal discharge   No breast tenderness, mass, nipple discharge, changes in size or contour, or abnormal cyclic discomfort.  No urinary tract symptoms, no incontinence.   No abdominal pain, change in bowel habits, black or bloody stools.    No unusual headaches, no visual changes, menstrual migraines   No prolonged cough. No dyspnea or chest pain on exertion.  No depression, labile mood, anxiety, libido changes, insomnia.  No polydipsia, polyuria, temperature intolerance.  No new/concerning skin lesions, concerns.     Exercise: minimal exercise  Preventive Care: Patient received influenza vaccine on 10/8/2019.  She has normal colonoscopy on 1/26/2011.  Patient is advised to receive shingles vaccine at local pharmacy due to clinic shortage.    Current medicines (including changes today)  Current Outpatient Medications   Medication Sig Dispense Refill   • simvastatin (ZOCOR) 20 MG Tab Take 1 Tab by mouth every evening. 90 Tab 3     No current facility-administered medications for this visit.      She  has a past medical history of Breast cancer (HCC) and Cancer (HCC) (2001).  She  has a past surgical history that includes endometrial ablation; gyn surgery; other; breast biopsy (9/23/2013); breast biopsy  "(10/1/2013); lumpectomy; us-needle core bx-breast panel; and pr radiation therapy plan simple.     Family History:   Family History   Problem Relation Age of Onset   • Diabetes Mother    • Cancer Father         Prostate Cancer          OBJECTIVE:   /54 (BP Location: Left arm, Patient Position: Sitting, BP Cuff Size: Adult)   Pulse 80   Temp 36.9 °C (98.4 °F) (Temporal)   Ht 1.61 m (5' 3.39\")   Wt 73 kg (161 lb)   SpO2 95%   BMI 28.17 kg/m²   Body mass index is 28.17 kg/m².    HEAD AND NECK:  Ears normal.  Throat, oral cavity and tongue normal.  Neck supple. No adenopathy or masses in the neck or supraclavicular regions.  No carotid bruits. No thyromegaly. NEURO: Cranial nerves are normal. DTR's normal and symmetric.  CHEST:  Clear, good air entry, no wheezes or rales. HEART:  Regular rate and rhythm.  S1 and S2 normal. No edema or JVD. ABDOMEN:  Soft without tenderness, guarding, mass or organomegaly.  No CVA tenderness or inguinal adenopathy. EXTREMITIES:  Extremities, reflexes and peripheral pulses are normal. SKIN: color normal, vascularity normal, no edema, temperature normal   No rashes or suspicious skin lesions noted.     Breast Exam: Performed with instruction during examination. No axillary lymphadenopathy, no skin changes, no dominant masses. No nipple retraction  Pelvic Exam -  Normal external genitalia with no lesions. Normal vaginal mucosa with thin rugation and no discharge. Cervix with no visible lesions. No cervical motion tenderness. Uterus is normal sized with no masses. No adnexal tenderness or enlargement appreciated. Thin Prep Pap is obtained, vaginal swab is not obtained and specimen(s) sent to lab  Rectal: deferred    <ASSESSMENT and PLAN>  1. Encounter for gynecological examination      Counseled for healthy diet, regular physical exercise, self breast exam once a month, annual screening mammogram, Kegel's exercise, vitamin D and calcium supplement daily.  Advised to use " water-based lubrication gel for vaginal dryness.  Patient reported that she does not have pain or itchiness at regular basis.  However she has discomfort and pain during sexual intercourse.  She agrees to try lubrication gel as needed during sexual intercourse.     2. Screening for cervical cancer  THINPREP PAP WITH HPV    Obtained Pap smear and sent to lab.  Will advise for result.         Discussed  breast self exam, mammography screening, menopause, adequate intake of calcium and vitamin D, diet and exercise, Kegel's exercises   Follow-up in 1 years for next Gyn exam and 3 years for next Pap.   Next office visit for recheck of chronic medical conditions is due in  1 year

## 2019-11-07 DIAGNOSIS — Z12.4 SCREENING FOR CERVICAL CANCER: ICD-10-CM

## 2019-11-07 LAB
CYTOLOGY REG CYTOL: NORMAL
HPV HR 12 DNA CVX QL NAA+PROBE: NEGATIVE
HPV16 DNA SPEC QL NAA+PROBE: NEGATIVE
HPV18 DNA SPEC QL NAA+PROBE: NEGATIVE
SPECIMEN SOURCE: NORMAL

## 2019-11-13 ENCOUNTER — TELEPHONE (OUTPATIENT)
Dept: MEDICAL GROUP | Age: 59
End: 2019-11-13

## 2019-11-13 NOTE — TELEPHONE ENCOUNTER
Phone Number Called: [phone] (home) [phone] (work)    Call outcome: left message for patient to call back regarding message below    Message: LVM for patient to call back for test results.

## 2019-11-13 NOTE — TELEPHONE ENCOUNTER
----- Message from Davina Grimes M.D. sent at 11/9/2019 12:10 PM PST -----  The results of her most recent Pap smear are normal. This means that no cancerous or precancerous cells were seen. We recommend that she comes back in 3 years for next routine Pap smear, however, she should still be seen annually for a well woman exam.  She has negative tests for HPV.     Davina Grimes M.D.

## 2020-01-06 ENCOUNTER — HOSPITAL ENCOUNTER (OUTPATIENT)
Dept: LAB | Facility: MEDICAL CENTER | Age: 60
End: 2020-01-06
Attending: INTERNAL MEDICINE
Payer: COMMERCIAL

## 2020-01-06 DIAGNOSIS — Z11.59 NEED FOR HEPATITIS C SCREENING TEST: ICD-10-CM

## 2020-01-06 DIAGNOSIS — E78.00 PURE HYPERCHOLESTEROLEMIA: ICD-10-CM

## 2020-01-06 DIAGNOSIS — R73.01 IFG (IMPAIRED FASTING GLUCOSE): ICD-10-CM

## 2020-01-06 LAB
ALBUMIN SERPL BCP-MCNC: 4 G/DL (ref 3.2–4.9)
ALBUMIN/GLOB SERPL: 1.1 G/DL
ALP SERPL-CCNC: 62 U/L (ref 30–99)
ALT SERPL-CCNC: 12 U/L (ref 2–50)
ANION GAP SERPL CALC-SCNC: 8 MMOL/L (ref 0–11.9)
AST SERPL-CCNC: 16 U/L (ref 12–45)
BILIRUB SERPL-MCNC: 1.2 MG/DL (ref 0.1–1.5)
BUN SERPL-MCNC: 16 MG/DL (ref 8–22)
CALCIUM SERPL-MCNC: 8.5 MG/DL (ref 8.5–10.5)
CHLORIDE SERPL-SCNC: 105 MMOL/L (ref 96–112)
CHOLEST SERPL-MCNC: 205 MG/DL (ref 100–199)
CO2 SERPL-SCNC: 26 MMOL/L (ref 20–33)
CREAT SERPL-MCNC: 0.7 MG/DL (ref 0.5–1.4)
GLOBULIN SER CALC-MCNC: 3.5 G/DL (ref 1.9–3.5)
GLUCOSE SERPL-MCNC: 96 MG/DL (ref 65–99)
HCV AB SER QL: NEGATIVE
HDLC SERPL-MCNC: 80 MG/DL
LDLC SERPL CALC-MCNC: 104 MG/DL
POTASSIUM SERPL-SCNC: 3.4 MMOL/L (ref 3.6–5.5)
PROT SERPL-MCNC: 7.5 G/DL (ref 6–8.2)
SODIUM SERPL-SCNC: 139 MMOL/L (ref 135–145)
TRIGL SERPL-MCNC: 103 MG/DL (ref 0–149)

## 2020-01-06 PROCEDURE — 80061 LIPID PANEL: CPT

## 2020-01-06 PROCEDURE — 86803 HEPATITIS C AB TEST: CPT

## 2020-01-06 PROCEDURE — 36415 COLL VENOUS BLD VENIPUNCTURE: CPT

## 2020-01-06 PROCEDURE — 80053 COMPREHEN METABOLIC PANEL: CPT

## 2020-01-09 ENCOUNTER — OFFICE VISIT (OUTPATIENT)
Dept: MEDICAL GROUP | Age: 60
End: 2020-01-09
Payer: COMMERCIAL

## 2020-01-09 VITALS
OXYGEN SATURATION: 96 % | WEIGHT: 163 LBS | SYSTOLIC BLOOD PRESSURE: 102 MMHG | DIASTOLIC BLOOD PRESSURE: 60 MMHG | BODY MASS INDEX: 28.88 KG/M2 | HEART RATE: 69 BPM | HEIGHT: 63 IN | TEMPERATURE: 97.8 F | RESPIRATION RATE: 16 BRPM

## 2020-01-09 DIAGNOSIS — Z85.3 HISTORY OF LEFT BREAST CANCER: ICD-10-CM

## 2020-01-09 DIAGNOSIS — N60.01 BREAST CYST, RIGHT: ICD-10-CM

## 2020-01-09 DIAGNOSIS — F32.1 CURRENT MODERATE EPISODE OF MAJOR DEPRESSIVE DISORDER WITHOUT PRIOR EPISODE (HCC): ICD-10-CM

## 2020-01-09 DIAGNOSIS — M72.2 PLANTAR FASCIITIS, BILATERAL: ICD-10-CM

## 2020-01-09 DIAGNOSIS — Z00.00 PE (PHYSICAL EXAM), ANNUAL: Primary | ICD-10-CM

## 2020-01-09 DIAGNOSIS — E78.00 PURE HYPERCHOLESTEROLEMIA: ICD-10-CM

## 2020-01-09 PROBLEM — M77.11 LATERAL EPICONDYLITIS OF RIGHT ELBOW: Status: RESOLVED | Noted: 2017-10-24 | Resolved: 2020-01-09

## 2020-01-09 PROBLEM — D72.819 CHRONIC LEUKOPENIA: Status: RESOLVED | Noted: 2018-09-19 | Resolved: 2020-01-09

## 2020-01-09 PROBLEM — B07.8 OTHER VIRAL WARTS: Status: RESOLVED | Noted: 2018-06-18 | Resolved: 2020-01-09

## 2020-01-09 PROBLEM — R73.01 IFG (IMPAIRED FASTING GLUCOSE): Status: RESOLVED | Noted: 2019-10-15 | Resolved: 2020-01-09

## 2020-01-09 PROCEDURE — 99396 PREV VISIT EST AGE 40-64: CPT | Mod: 25 | Performed by: FAMILY MEDICINE

## 2020-01-09 PROCEDURE — 20550 NJX 1 TENDON SHEATH/LIGAMENT: CPT | Mod: 50 | Performed by: FAMILY MEDICINE

## 2020-01-09 RX ORDER — TRIAMCINOLONE ACETONIDE 40 MG/ML
40 INJECTION, SUSPENSION INTRA-ARTICULAR; INTRAMUSCULAR ONCE
Status: COMPLETED | OUTPATIENT
Start: 2020-01-09 | End: 2020-01-09

## 2020-01-09 RX ORDER — CITALOPRAM HYDROBROMIDE 10 MG/1
10 TABLET ORAL DAILY
Qty: 90 TAB | Refills: 0 | Status: SHIPPED | OUTPATIENT
Start: 2020-01-09 | End: 2020-04-15

## 2020-01-09 RX ADMIN — TRIAMCINOLONE ACETONIDE 40 MG: 40 INJECTION, SUSPENSION INTRA-ARTICULAR; INTRAMUSCULAR at 16:39

## 2020-01-09 RX ADMIN — TRIAMCINOLONE ACETONIDE 40 MG: 40 INJECTION, SUSPENSION INTRA-ARTICULAR; INTRAMUSCULAR at 16:38

## 2020-01-10 NOTE — PROGRESS NOTES
Subjective:   CC: establish care, annual PE     HPI:     Kena Gomez is a 59 y.o. female who is an established patient of the clinic, presents with the following concerns:     This is a former patient of Dr. Davina Grimes.    Patient has history of bilateral plantar fascitis, L worse than R, and reports heel pain especially upon waking in the morning. Symptoms are very bothersome to her and affect quality of life. She tried stretching exercise with minimal relief. She is not interested in splints. She has not had steroid injections in the past, however is interested in trying treatment today.    Patient has history of Stage 1 L breast cancer diagnosed in 2013. Patient has received radiation therapy, and is currently in remission. She reports recent ultrasound revealed a benign cyst in R breast,     Patient has history of pure hypercholesterolemia, well managed with simvastatin 20 mg QD without side effect. Lipid panel on 1/6/19 were improved compared to 10/5/19.    Patient has history of depression. She is interested in counseling, however states she is unable to set up appointment with counselor due to her insurance. Patient states she is having increased depression over the past few months which she attributes to recent move. Her family notices that she is more depressed and is very concerned. She is open to trying anti-depression medications.     Current medicines (including changes today)  Current Outpatient Medications   Medication Sig Dispense Refill   • citalopram (CELEXA) 10 MG tablet Take 1 Tab by mouth every day. 90 Tab 0   • simvastatin (ZOCOR) 20 MG Tab TAKE 1 TABLET BY MOUTH ONCE DAILY IN THE EVENING 90 Tab 3     No current facility-administered medications for this visit.      She  has a past medical history of Breast cancer (HCC) and Cancer (HCC) (2001).    I personally reviewed patient's problem list, allergies, medications, family hx, social hx with patient and update EPIC.     REVIEW OF  "SYSTEMS:  CONSTITUTIONAL:  Denies night sweats, fatigue, malaise, lethargy, fever or chills.  RESPIRATORY:  Denies cough, wheeze, hemoptysis, or shortness of breath.  CARDIOVASCULAR:  Denies chest pains, palpitations, pedal edema     Objective:     /60 (BP Location: Left arm, Patient Position: Sitting, BP Cuff Size: Adult)   Pulse 69   Temp 36.6 °C (97.8 °F) (Temporal)   Resp 16   Ht 1.6 m (5' 3\")   Wt 73.9 kg (163 lb)   SpO2 96%  Body mass index is 28.87 kg/m².    Physical Exam:  Constitutional: awake, alert, in no distress.  Skin: Warm, dry, good turgor, no rashes, bruises, ulcers in visible areas.  Eye: conjunctiva clear, lids neg for edema or lesions.  ENMT: TM and auditory canals wnl. Oral and nasal mucosa wnl. Lips without lesions, good dentition, oropharynx clear.  Neck: Trachea midline, no masses, no thyromegaly. No cervical or supraclavicular lymphadenopathy  Respiratory: Unlabored respiratory effort, lungs clear to auscultation, no wheezes, no rales.  Cardiovascular: Normal S1, S2, no murmur, no pedal edema.  Psych: Oriented x3, affect and mood wnl, intact judgement and insight.   MSK: severe tenderness to palpation of soft tissue at the origins of the plantar fascia bilaterally. The rest of foot exam were unremarkable bilaterally.     Assessment and Plan:   The following treatment plan was discussed    1. History of left breast cancer  Diagnosed in 2013, s/p lumpectomy and radiation, now in remission. Denies any breast symptoms. Will monitor. Annual mammogram recommended.     2. Breast cyst, right  Mammogram done in 9/2019 was notable for 5.3 mm diameter complicated cyst at the upper outer quadrant of the right breast. Six-month interval follow-up ultrasound reassessment is recommended. Follow up breast US scheduled for 3/2020.    3. Pure hypercholesterolemia  Chronic, controlled with Zocor 20 mg qd, no s/e reported, will continue.    - recommended dietary modification, exercise, and weight " loss.      4. Plantar fasciitis, bilateral  Bilateral plantar fasciitis, failed conservative management.   - steroid injections, bilaterally.   - triamcinolone acetonide (KENALOG-40) injection 40 mg  - triamcinolone acetonide (KENALOG-40) injection 40 mg    5. Current moderate episode of major depressive disorder without prior episode (HCC)  Chronic, worsening depressive symptoms over the past few months. Dr. Grimes referred to  but she has not scheduled. She is open to try SSRI. Denies SI/HI.   - f/u with   - start citalopram (CELEXA) 10 MG tablet; Take 1 Tab by mouth every day.  Dispense: 90 Tab; Refill: 0  - Appropriate counseling provided. Topics might include: regular exercise, well-balanced diet, multi-vitamin daily, weight loss, adequate sleep, meditation, stress management, avoidance of excessive consumption of caffeine, alcohol, illicit drugs, tobacco.      6. PE (physical exam), annual  General counseling provided, topics might include: diet, exercise, vitamin supplement, mental health, sleep, stress management, pap, mammogram, colonoscopy and vaccine recommendations.        Plantar Fasciitis Procedure note:   Indication: plantar fasciitis, failed non-invasive conservative management.   Location: bilateral  Solution: 3 ml of 1% lidocaine w/o EPI + 40 mg of Kenalog (x2)    PARQ obtained. Pt consents to the procedure after thorough review risks/benefits with provider.     Insertion site was identified on the L foot and cleansed with alcohol swab. Position the needle and syringe perpendicular to the skin. Using the no-touch technique, the needle is advanced to the origin of plantar fascia, and the steroid-lidocaine mixture was injected without resistance. The needle is then withdrawn. Sterile adhesive bandage is applied. the heel was gently massaged. Similar steps were repeated on pt's R foot. Pt tolerates the procedure well, no immediate complications noted. Pt endorses improvement of symptoms after 5-10  minutes.     Sarah Chao M.D.    Followup: Return in about 3 months (around 4/9/2020) for Multiple issues.    Please note that this dictation was created using voice recognition software and/or scribes. I have made every reasonable attempt to correct obvious errors, but I expect that there are errors of grammar and possibly content that I did not discover before finalizing the note.     I, Pushpa Gomez (Scribe), am scribing for, and in the presence of, Sraah Chao M.D.    Electronically signed by: Pushpa Gomez (Scribe), 1/9/2020    ISarah M.D. personally performed the services described in this documentation, as scribed by Pushpa Gomez in my presence, and it is both accurate and complete.

## 2020-03-16 ENCOUNTER — HOSPITAL ENCOUNTER (OUTPATIENT)
Dept: RADIOLOGY | Facility: MEDICAL CENTER | Age: 60
End: 2020-03-16
Attending: INTERNAL MEDICINE
Payer: COMMERCIAL

## 2020-03-16 ENCOUNTER — HOSPITAL ENCOUNTER (OUTPATIENT)
Dept: RADIOLOGY | Facility: MEDICAL CENTER | Age: 60
End: 2020-03-16
Attending: FAMILY MEDICINE
Payer: COMMERCIAL

## 2020-03-16 DIAGNOSIS — R92.8 FOLLOW-UP EXAMINATION OF ABNORMAL MAMMOGRAM: ICD-10-CM

## 2020-03-16 DIAGNOSIS — N60.01 BREAST CYST, RIGHT: ICD-10-CM

## 2020-03-16 PROCEDURE — 76642 ULTRASOUND BREAST LIMITED: CPT | Mod: RT

## 2020-04-15 DIAGNOSIS — F32.1 CURRENT MODERATE EPISODE OF MAJOR DEPRESSIVE DISORDER WITHOUT PRIOR EPISODE (HCC): ICD-10-CM

## 2020-04-16 RX ORDER — CITALOPRAM HYDROBROMIDE 10 MG/1
TABLET ORAL
Qty: 90 TAB | Refills: 0 | Status: SHIPPED
Start: 2020-04-16 | End: 2020-05-11

## 2020-05-11 ENCOUNTER — TELEMEDICINE (OUTPATIENT)
Dept: MEDICAL GROUP | Age: 60
End: 2020-05-11
Payer: COMMERCIAL

## 2020-05-11 VITALS — WEIGHT: 163 LBS | HEIGHT: 63 IN | BODY MASS INDEX: 28.88 KG/M2

## 2020-05-11 DIAGNOSIS — F32.1 CURRENT MODERATE EPISODE OF MAJOR DEPRESSIVE DISORDER WITHOUT PRIOR EPISODE (HCC): Primary | ICD-10-CM

## 2020-05-11 DIAGNOSIS — M72.2 PLANTAR FASCIITIS, BILATERAL: ICD-10-CM

## 2020-05-11 DIAGNOSIS — Z00.00 PE (PHYSICAL EXAM), ANNUAL: ICD-10-CM

## 2020-05-11 DIAGNOSIS — E78.00 PURE HYPERCHOLESTEROLEMIA: ICD-10-CM

## 2020-05-11 PROCEDURE — 99214 OFFICE O/P EST MOD 30 MIN: CPT | Mod: 95,CR | Performed by: FAMILY MEDICINE

## 2020-05-11 RX ORDER — CITALOPRAM 20 MG/1
20 TABLET ORAL DAILY
Qty: 90 TAB | Refills: 1 | Status: SHIPPED | OUTPATIENT
Start: 2020-05-11 | End: 2020-11-09 | Stop reason: SDUPTHER

## 2020-05-11 ASSESSMENT — FIBROSIS 4 INDEX: FIB4 SCORE: 1.082531754730548308

## 2020-05-11 NOTE — PROGRESS NOTES
Telemedicine Visit: Established Patient     This encounter was conducted via Zoom .   Verbal consent was obtained. Patient's identity was verified.    Subjective:   CC: mental health f/u   Kena Gomez is a 59 y.o. female presenting for evaluation and management of:    Has history of MDD, currently taking citalopram 10 mg daily.  Patient states that citalopram is helping with her symptoms.  However, she complains of worsening depressive symptoms secondary to social isolation and stress associated with COVID-19 outbreak.  She requests to take higher dose of citalopram.    Patient has history of hyperlipidemia, currently under good control with Zocor 20 mg daily.  She tolerated medication well, no side effect reported    Patient also has history of bilateral plantar fasciitis status post steroid injection at previous office visit in January 2020.  She endorses significant relief with symptoms.  She continue to do stretching exercise regularly at home.    ROS   Denies any recent fevers or chills. No nausea or vomiting. No chest pains or shortness of breath.     No Known Allergies    Current medicines (including changes today)  Current Outpatient Medications   Medication Sig Dispense Refill   • citalopram (CELEXA) 20 MG Tab Take 1 Tab by mouth every day. 90 Tab 1   • simvastatin (ZOCOR) 20 MG Tab TAKE 1 TABLET BY MOUTH ONCE DAILY IN THE EVENING 90 Tab 3     No current facility-administered medications for this visit.        Patient Active Problem List    Diagnosis Date Noted   • Breast cyst, right, complex_repeat US in 9/2020.  03/16/2020   • Plantar fasciitis, bilateral 10/15/2019   • Pure hypercholesterolemia 04/10/2016   • Current moderate episode of major depressive disorder without prior episode (HCC) 03/16/2016   • History of left breast cancer 10/01/2013       Family History   Problem Relation Age of Onset   • Diabetes Mother    • Cancer Father         Prostate Cancer       She  has a past medical history of  "Breast cancer (HCC) and Cancer (HCC) (2001).  She  has a past surgical history that includes endometrial ablation; gyn surgery; other; breast biopsy (9/23/2013); breast biopsy (10/1/2013); lumpectomy; us-needle core bx-breast panel; and pr radiation therapy plan simple.       Objective:   Vitals obtained by patient:  Ht 1.6 m (5' 3\")   Wt 73.9 kg (163 lb) Comment: pt stated  BMI 28.87 kg/m²      Physical Exam:  Constitutional: Alert, no distress, well-groomed.  Skin: No rashes in visible areas.  Eye: Round. Conjunctiva clear, lids normal. No icterus.   ENMT: Lips pink without lesions, good dentition, moist mucous membranes. Phonation normal.  Neck: No masses, no thyromegaly. Moves freely without pain.  Respiratory: Unlabored respiratory effort, no cough or audible wheeze  Psych: Alert and oriented x3, normal affect and mood.       Assessment and Plan:   The following treatment plan was discussed:     1. Plantar fasciitis, bilateral  Significant improvement after steroid injection in 1/2020.   - weight loss  - stretching exercises  - OTC analgesics PRN for pain    2. Pure hypercholesterolemia  Chronic, controlled with Zocor 20 mg qd, no s/e reported, will continue.      3. Current moderate episode of major depressive disorder without prior episode (HCC)  Chronic, previously under good control with Celexa 10 mg qd. She c/o worsening depressive symptoms secondary to covid 19 outbreak. She denies suicidal ideation or plans. She does not work with [facility].   - increase Celexa from 10 to 20 mg qd: citalopram (CELEXA) 20 MG Tab; Take 1 Tab by mouth every day.  Dispense: 90 Tab; Refill: 1  - Appropriate counseling provided. Topics might include: regular exercise, well-balanced diet, multi-vitamin daily, weight loss, adequate sleep, meditation, stress management, avoidance of excessive consumption of caffeine, alcohol, illicit drugs, tobacco.        Follow-up: Return in about 6 months (around 11/11/2020) for annual PE.         "

## 2020-09-22 ENCOUNTER — TELEPHONE (OUTPATIENT)
Dept: MEDICAL GROUP | Age: 60
End: 2020-09-22

## 2020-09-22 DIAGNOSIS — Z12.31 ENCOUNTER FOR SCREENING MAMMOGRAM FOR BREAST CANCER: ICD-10-CM

## 2020-09-22 NOTE — TELEPHONE ENCOUNTER
VOICEMAIL  1. Caller Name: Kena Gomez                        Call Back Number: [phone] (home) [phone] (work)      2. Message: pt would like mammogram ordered    3. Patient approves office to leave a detailed voicemail/MyChart message: N\A

## 2020-09-25 ENCOUNTER — TELEPHONE (OUTPATIENT)
Dept: RADIOLOGY | Facility: MEDICAL CENTER | Age: 60
End: 2020-09-25

## 2020-09-28 ENCOUNTER — HOSPITAL ENCOUNTER (OUTPATIENT)
Dept: RADIOLOGY | Facility: MEDICAL CENTER | Age: 60
End: 2020-09-28
Attending: FAMILY MEDICINE
Payer: COMMERCIAL

## 2020-09-28 DIAGNOSIS — N60.01 BREAST CYST, RIGHT: ICD-10-CM

## 2020-09-28 DIAGNOSIS — N60.01 CYST OF RIGHT BREAST: ICD-10-CM

## 2020-09-28 PROCEDURE — 76642 ULTRASOUND BREAST LIMITED: CPT | Mod: RT

## 2020-09-28 PROCEDURE — G0279 TOMOSYNTHESIS, MAMMO: HCPCS

## 2020-09-28 NOTE — TELEPHONE ENCOUNTER
Phone Number Called: [phone] (home) [phone] (work)      Call outcome: Did not leave a detailed message. Requested patient to call back.    Message: LVM 1st attempt

## 2020-11-07 ENCOUNTER — HOSPITAL ENCOUNTER (OUTPATIENT)
Dept: LAB | Facility: MEDICAL CENTER | Age: 60
End: 2020-11-07
Attending: FAMILY MEDICINE
Payer: COMMERCIAL

## 2020-11-07 DIAGNOSIS — Z00.00 PE (PHYSICAL EXAM), ANNUAL: ICD-10-CM

## 2020-11-07 LAB
ALBUMIN SERPL BCP-MCNC: 3.9 G/DL (ref 3.2–4.9)
ALBUMIN/GLOB SERPL: 1.4 G/DL
ALP SERPL-CCNC: 58 U/L (ref 30–99)
ALT SERPL-CCNC: 14 U/L (ref 2–50)
ANION GAP SERPL CALC-SCNC: 8 MMOL/L (ref 7–16)
AST SERPL-CCNC: 19 U/L (ref 12–45)
BASOPHILS # BLD AUTO: 1.4 % (ref 0–1.8)
BASOPHILS # BLD: 0.06 K/UL (ref 0–0.12)
BILIRUB SERPL-MCNC: 1.1 MG/DL (ref 0.1–1.5)
BUN SERPL-MCNC: 11 MG/DL (ref 8–22)
CALCIUM SERPL-MCNC: 8.6 MG/DL (ref 8.5–10.5)
CHLORIDE SERPL-SCNC: 103 MMOL/L (ref 96–112)
CHOLEST SERPL-MCNC: 213 MG/DL (ref 100–199)
CO2 SERPL-SCNC: 28 MMOL/L (ref 20–33)
CREAT SERPL-MCNC: 0.7 MG/DL (ref 0.5–1.4)
EOSINOPHIL # BLD AUTO: 0.13 K/UL (ref 0–0.51)
EOSINOPHIL NFR BLD: 3 % (ref 0–6.9)
ERYTHROCYTE [DISTWIDTH] IN BLOOD BY AUTOMATED COUNT: 46.1 FL (ref 35.9–50)
EST. AVERAGE GLUCOSE BLD GHB EST-MCNC: 114 MG/DL
FASTING STATUS PATIENT QL REPORTED: NORMAL
GLOBULIN SER CALC-MCNC: 2.8 G/DL (ref 1.9–3.5)
GLUCOSE SERPL-MCNC: 92 MG/DL (ref 65–99)
HBA1C MFR BLD: 5.6 % (ref 0–5.6)
HCT VFR BLD AUTO: 42.8 % (ref 37–47)
HDLC SERPL-MCNC: 66 MG/DL
HGB BLD-MCNC: 13.4 G/DL (ref 12–16)
IMM GRANULOCYTES # BLD AUTO: 0.02 K/UL (ref 0–0.11)
IMM GRANULOCYTES NFR BLD AUTO: 0.5 % (ref 0–0.9)
LDLC SERPL CALC-MCNC: 121 MG/DL
LYMPHOCYTES # BLD AUTO: 1.31 K/UL (ref 1–4.8)
LYMPHOCYTES NFR BLD: 30.1 % (ref 22–41)
MCH RBC QN AUTO: 28.9 PG (ref 27–33)
MCHC RBC AUTO-ENTMCNC: 31.3 G/DL (ref 33.6–35)
MCV RBC AUTO: 92.2 FL (ref 81.4–97.8)
MONOCYTES # BLD AUTO: 0.33 K/UL (ref 0–0.85)
MONOCYTES NFR BLD AUTO: 7.6 % (ref 0–13.4)
NEUTROPHILS # BLD AUTO: 2.5 K/UL (ref 2–7.15)
NEUTROPHILS NFR BLD: 57.4 % (ref 44–72)
NRBC # BLD AUTO: 0 K/UL
NRBC BLD-RTO: 0 /100 WBC
PLATELET # BLD AUTO: 213 K/UL (ref 164–446)
PMV BLD AUTO: 10 FL (ref 9–12.9)
POTASSIUM SERPL-SCNC: 4 MMOL/L (ref 3.6–5.5)
PROT SERPL-MCNC: 6.7 G/DL (ref 6–8.2)
RBC # BLD AUTO: 4.64 M/UL (ref 4.2–5.4)
SODIUM SERPL-SCNC: 139 MMOL/L (ref 135–145)
TRIGL SERPL-MCNC: 130 MG/DL (ref 0–149)
WBC # BLD AUTO: 4.4 K/UL (ref 4.8–10.8)

## 2020-11-07 PROCEDURE — 80061 LIPID PANEL: CPT

## 2020-11-07 PROCEDURE — 80053 COMPREHEN METABOLIC PANEL: CPT

## 2020-11-07 PROCEDURE — 36415 COLL VENOUS BLD VENIPUNCTURE: CPT

## 2020-11-07 PROCEDURE — 83036 HEMOGLOBIN GLYCOSYLATED A1C: CPT

## 2020-11-07 PROCEDURE — 85025 COMPLETE CBC W/AUTO DIFF WBC: CPT

## 2020-11-09 ENCOUNTER — OFFICE VISIT (OUTPATIENT)
Dept: MEDICAL GROUP | Age: 60
End: 2020-11-09
Payer: COMMERCIAL

## 2020-11-09 VITALS
HEIGHT: 63 IN | TEMPERATURE: 98.1 F | OXYGEN SATURATION: 92 % | BODY MASS INDEX: 29.95 KG/M2 | DIASTOLIC BLOOD PRESSURE: 64 MMHG | HEART RATE: 73 BPM | SYSTOLIC BLOOD PRESSURE: 112 MMHG | WEIGHT: 169 LBS

## 2020-11-09 DIAGNOSIS — Z23 NEED FOR VACCINATION: ICD-10-CM

## 2020-11-09 DIAGNOSIS — E78.00 PURE HYPERCHOLESTEROLEMIA: ICD-10-CM

## 2020-11-09 DIAGNOSIS — Z00.00 PE (PHYSICAL EXAM), ANNUAL: Primary | ICD-10-CM

## 2020-11-09 DIAGNOSIS — M72.2 PLANTAR FASCIITIS, BILATERAL: ICD-10-CM

## 2020-11-09 DIAGNOSIS — F32.1 CURRENT MODERATE EPISODE OF MAJOR DEPRESSIVE DISORDER WITHOUT PRIOR EPISODE (HCC): ICD-10-CM

## 2020-11-09 PROCEDURE — 99396 PREV VISIT EST AGE 40-64: CPT | Performed by: FAMILY MEDICINE

## 2020-11-09 RX ORDER — ATORVASTATIN CALCIUM 20 MG/1
20 TABLET, FILM COATED ORAL
Qty: 90 TAB | Refills: 3 | Status: SHIPPED | OUTPATIENT
Start: 2020-11-09 | End: 2021-11-08 | Stop reason: SDUPTHER

## 2020-11-09 RX ORDER — CITALOPRAM 20 MG/1
20 TABLET ORAL DAILY
Qty: 90 TAB | Refills: 3 | Status: SHIPPED | OUTPATIENT
Start: 2020-11-09 | End: 2021-09-27 | Stop reason: SDUPTHER

## 2020-11-09 ASSESSMENT — FIBROSIS 4 INDEX: FIB4 SCORE: 1.45

## 2020-11-10 NOTE — PATIENT INSTRUCTIONS
[email or web address]

## 2020-11-11 NOTE — PROGRESS NOTES
"Subjective:   CC: annual PE     HPI:     Kena Gomez is a 60 y.o. female, established patient of the clinic, presents with the following concerns:     Pt has chronic HLD, depression, and bilateral plantar fasciitis. She is compliant with medications. She tolerates them well, no s/e reported.     She received steroid injections for bilateral plantar fasciitis in 1/2020 with significant relief of symptoms. She had repeated injection in 7/2020 by podiatrist, but states that there were no changes in her symptoms with subsequent injections. She manages her symptoms now with shoe inserts, exercise, OTC medications. She has not tried splint.    Current medicines (including changes today)  Current Outpatient Medications   Medication Sig Dispense Refill   • atorvastatin (LIPITOR) 20 MG Tab Take 1 Tab by mouth every bedtime. 90 Tab 3   • citalopram (CELEXA) 20 MG Tab Take 1 Tab by mouth every day. 90 Tab 3     No current facility-administered medications for this visit.      She  has a past medical history of Breast cancer (HCC) and Cancer (HCC) (2001).    I personally reviewed patient's problem list, allergies, medications, family hx, social hx with patient and update EPIC.     REVIEW OF SYSTEMS:  CONSTITUTIONAL:  Denies night sweats, fatigue, malaise, lethargy, fever or chills.  RESPIRATORY:  Denies cough, wheeze, hemoptysis, or shortness of breath.  CARDIOVASCULAR:  Denies chest pains, palpitations, pedal edema     Objective:     /64 (BP Location: Left arm, Patient Position: Sitting, BP Cuff Size: Adult)   Pulse 73   Temp 36.7 °C (98.1 °F) (Temporal)   Ht 1.6 m (5' 3\")   Wt 76.7 kg (169 lb)   SpO2 92%  Body mass index is 29.94 kg/m².    Physical Exam:  Constitutional: awake, alert, in no distress.  Skin: Warm, dry, good turgor, no rashes, bruises, ulcers in visible areas.  Eye: conjunctiva clear, lids neg for edema or lesions.  ENMT: TM and auditory canals wnl.    Neck: Trachea midline, no masses, no " thyromegaly. No cervical or supraclavicular lymphadenopathy  Respiratory: Unlabored respiratory effort, lungs clear to auscultation, no wheezes, no rales.  Cardiovascular: Normal S1, S2, no murmur, no pedal edema.  Psych: Oriented x3, affect and mood wnl, intact judgement and insight.       Assessment and Plan:   The following treatment plan was discussed    1. Pure hypercholesterolemia  Chronic, currently taking Zocor 20 mg qd. Recent lipid profile still shows elevated total cholesterol and LDL. Plans:   - atorvastatin (LIPITOR) 20 MG Tab; Take 1 Tab by mouth every bedtime.  Dispense: 90 Tab; Refill: 3  - Lipid Profile; Future  - dietary modification, exercise, and weight loss.   - avoid alcohol, drugs, tobacco products     2. Current moderate episode of major depressive disorder without prior episode (HCC)  Chronic, controlled with Celexa 20 mg qd, no s/e reported, will continue.    - citalopram (CELEXA) 20 MG Tab; Take 1 Tab by mouth every day.  Dispense: 90 Tab; Refill: 3  - CBC WITH DIFFERENTIAL; Future  - Comp Metabolic Panel; Future    3. Plantar fasciitis, bilateral  -Over-the-counter analgesics PRN for pain  -Night splint  -Weight loss  -Stretching exercise  -Icing, activity modification  -Appropriate footwear  -Heel lift     4. PE (physical exam), annual  General health and wellness counseling provided.      - CBC WITH DIFFERENTIAL; Future  - Comp Metabolic Panel; Future  - HEMOGLOBIN A1C; Future  - Lipid Profile; Future      Ly KELLY Chao M.D.      Followup: Return in about 1 year (around 11/9/2021) for annual PE.    Please note that this dictation was created using voice recognition software. I have made every reasonable attempt to correct obvious errors, but I expect that there are errors of grammar and possibly content that I did not discover before finalizing the note.

## 2020-11-23 DIAGNOSIS — E78.00 PURE HYPERCHOLESTEROLEMIA: ICD-10-CM

## 2020-11-24 RX ORDER — SIMVASTATIN 20 MG
TABLET ORAL
Qty: 90 TAB | Refills: 3 | OUTPATIENT
Start: 2020-11-24

## 2020-12-10 DIAGNOSIS — E78.00 PURE HYPERCHOLESTEROLEMIA: ICD-10-CM

## 2020-12-11 RX ORDER — SIMVASTATIN 20 MG
TABLET ORAL
Qty: 90 TAB | Refills: 3 | OUTPATIENT
Start: 2020-12-11

## 2021-03-15 DIAGNOSIS — Z23 NEED FOR VACCINATION: ICD-10-CM

## 2021-05-10 ENCOUNTER — TELEPHONE (OUTPATIENT)
Dept: MEDICAL GROUP | Age: 61
End: 2021-05-10

## 2021-05-10 DIAGNOSIS — Z86.11 HISTORY OF PRIMARY TB: ICD-10-CM

## 2021-05-10 NOTE — TELEPHONE ENCOUNTER
VOICEMAIL  1. Caller Name: Kena Gomez                        Call Back Number: [phone] (home) [phone] (work)      2. Message: pt requesting chest x-ray per employer request due to history of TB    3. Patient approves office to leave a detailed voicemail/MyChart message: no

## 2021-05-14 ENCOUNTER — HOSPITAL ENCOUNTER (OUTPATIENT)
Dept: RADIOLOGY | Facility: MEDICAL CENTER | Age: 61
End: 2021-05-14
Attending: FAMILY MEDICINE
Payer: COMMERCIAL

## 2021-05-14 DIAGNOSIS — Z86.11 HISTORY OF PRIMARY TB: ICD-10-CM

## 2021-05-14 PROCEDURE — 71046 X-RAY EXAM CHEST 2 VIEWS: CPT

## 2021-08-27 ENCOUNTER — HOSPITAL ENCOUNTER (OUTPATIENT)
Dept: RADIOLOGY | Facility: MEDICAL CENTER | Age: 61
End: 2021-08-27
Attending: FAMILY MEDICINE
Payer: COMMERCIAL

## 2021-08-27 DIAGNOSIS — Z12.31 VISIT FOR SCREENING MAMMOGRAM: ICD-10-CM

## 2021-08-27 PROCEDURE — 77063 BREAST TOMOSYNTHESIS BI: CPT

## 2021-09-27 DIAGNOSIS — F32.1 CURRENT MODERATE EPISODE OF MAJOR DEPRESSIVE DISORDER WITHOUT PRIOR EPISODE (HCC): ICD-10-CM

## 2021-09-27 RX ORDER — CITALOPRAM 20 MG/1
20 TABLET ORAL DAILY
Qty: 90 TABLET | Refills: 0 | Status: SHIPPED | OUTPATIENT
Start: 2021-09-27 | End: 2021-11-08 | Stop reason: SDUPTHER

## 2021-10-30 ENCOUNTER — HOSPITAL ENCOUNTER (OUTPATIENT)
Dept: LAB | Facility: MEDICAL CENTER | Age: 61
End: 2021-10-30
Attending: FAMILY MEDICINE
Payer: COMMERCIAL

## 2021-10-30 DIAGNOSIS — F32.1 CURRENT MODERATE EPISODE OF MAJOR DEPRESSIVE DISORDER WITHOUT PRIOR EPISODE (HCC): ICD-10-CM

## 2021-10-30 DIAGNOSIS — Z00.00 PE (PHYSICAL EXAM), ANNUAL: ICD-10-CM

## 2021-10-30 DIAGNOSIS — E78.00 PURE HYPERCHOLESTEROLEMIA: ICD-10-CM

## 2021-10-30 LAB
ALBUMIN SERPL BCP-MCNC: 3.8 G/DL (ref 3.2–4.9)
ALBUMIN/GLOB SERPL: 1.3 G/DL
ALP SERPL-CCNC: 63 U/L (ref 30–99)
ALT SERPL-CCNC: 14 U/L (ref 2–50)
ANION GAP SERPL CALC-SCNC: 7 MMOL/L (ref 7–16)
AST SERPL-CCNC: 17 U/L (ref 12–45)
BASOPHILS # BLD AUTO: 1.1 % (ref 0–1.8)
BASOPHILS # BLD: 0.06 K/UL (ref 0–0.12)
BILIRUB SERPL-MCNC: 0.5 MG/DL (ref 0.1–1.5)
BUN SERPL-MCNC: 20 MG/DL (ref 8–22)
CALCIUM SERPL-MCNC: 8.9 MG/DL (ref 8.5–10.5)
CHLORIDE SERPL-SCNC: 111 MMOL/L (ref 96–112)
CHOLEST SERPL-MCNC: 185 MG/DL (ref 100–199)
CO2 SERPL-SCNC: 25 MMOL/L (ref 20–33)
CREAT SERPL-MCNC: 0.7 MG/DL (ref 0.5–1.4)
EOSINOPHIL # BLD AUTO: 0.16 K/UL (ref 0–0.51)
EOSINOPHIL NFR BLD: 2.9 % (ref 0–6.9)
ERYTHROCYTE [DISTWIDTH] IN BLOOD BY AUTOMATED COUNT: 45.1 FL (ref 35.9–50)
EST. AVERAGE GLUCOSE BLD GHB EST-MCNC: 120 MG/DL
FASTING STATUS PATIENT QL REPORTED: NORMAL
GLOBULIN SER CALC-MCNC: 2.9 G/DL (ref 1.9–3.5)
GLUCOSE SERPL-MCNC: 102 MG/DL (ref 65–99)
HBA1C MFR BLD: 5.8 % (ref 4–5.6)
HCT VFR BLD AUTO: 40.7 % (ref 37–47)
HDLC SERPL-MCNC: 65 MG/DL
HGB BLD-MCNC: 13 G/DL (ref 12–16)
IMM GRANULOCYTES # BLD AUTO: 0.01 K/UL (ref 0–0.11)
IMM GRANULOCYTES NFR BLD AUTO: 0.2 % (ref 0–0.9)
LDLC SERPL CALC-MCNC: 81 MG/DL
LYMPHOCYTES # BLD AUTO: 1.31 K/UL (ref 1–4.8)
LYMPHOCYTES NFR BLD: 23.8 % (ref 22–41)
MCH RBC QN AUTO: 29 PG (ref 27–33)
MCHC RBC AUTO-ENTMCNC: 31.9 G/DL (ref 33.6–35)
MCV RBC AUTO: 90.6 FL (ref 81.4–97.8)
MONOCYTES # BLD AUTO: 0.33 K/UL (ref 0–0.85)
MONOCYTES NFR BLD AUTO: 6 % (ref 0–13.4)
NEUTROPHILS # BLD AUTO: 3.63 K/UL (ref 2–7.15)
NEUTROPHILS NFR BLD: 66 % (ref 44–72)
NRBC # BLD AUTO: 0 K/UL
NRBC BLD-RTO: 0 /100 WBC
PLATELET # BLD AUTO: 234 K/UL (ref 164–446)
PMV BLD AUTO: 10.3 FL (ref 9–12.9)
POTASSIUM SERPL-SCNC: 4.3 MMOL/L (ref 3.6–5.5)
PROT SERPL-MCNC: 6.7 G/DL (ref 6–8.2)
RBC # BLD AUTO: 4.49 M/UL (ref 4.2–5.4)
SODIUM SERPL-SCNC: 143 MMOL/L (ref 135–145)
TRIGL SERPL-MCNC: 194 MG/DL (ref 0–149)
WBC # BLD AUTO: 5.5 K/UL (ref 4.8–10.8)

## 2021-10-30 PROCEDURE — 36415 COLL VENOUS BLD VENIPUNCTURE: CPT

## 2021-10-30 PROCEDURE — 80053 COMPREHEN METABOLIC PANEL: CPT

## 2021-10-30 PROCEDURE — 85025 COMPLETE CBC W/AUTO DIFF WBC: CPT

## 2021-10-30 PROCEDURE — 83036 HEMOGLOBIN GLYCOSYLATED A1C: CPT

## 2021-10-30 PROCEDURE — 80061 LIPID PANEL: CPT

## 2021-11-08 ENCOUNTER — OFFICE VISIT (OUTPATIENT)
Dept: MEDICAL GROUP | Age: 61
End: 2021-11-08
Payer: COMMERCIAL

## 2021-11-08 VITALS
OXYGEN SATURATION: 97 % | DIASTOLIC BLOOD PRESSURE: 60 MMHG | TEMPERATURE: 96.5 F | WEIGHT: 166 LBS | BODY MASS INDEX: 29.41 KG/M2 | HEART RATE: 63 BPM | HEIGHT: 63 IN | SYSTOLIC BLOOD PRESSURE: 100 MMHG

## 2021-11-08 DIAGNOSIS — Z85.3 HISTORY OF LEFT BREAST CANCER: ICD-10-CM

## 2021-11-08 DIAGNOSIS — F32.1 CURRENT MODERATE EPISODE OF MAJOR DEPRESSIVE DISORDER WITHOUT PRIOR EPISODE (HCC): ICD-10-CM

## 2021-11-08 DIAGNOSIS — R73.03 PREDIABETES: ICD-10-CM

## 2021-11-08 DIAGNOSIS — E78.00 PURE HYPERCHOLESTEROLEMIA: ICD-10-CM

## 2021-11-08 DIAGNOSIS — Z00.00 PE (PHYSICAL EXAM), ANNUAL: ICD-10-CM

## 2021-11-08 DIAGNOSIS — Z23 NEED FOR VACCINATION: ICD-10-CM

## 2021-11-08 PROCEDURE — 99396 PREV VISIT EST AGE 40-64: CPT | Performed by: FAMILY MEDICINE

## 2021-11-08 RX ORDER — ATORVASTATIN CALCIUM 20 MG/1
20 TABLET, FILM COATED ORAL
Qty: 90 TABLET | Refills: 3 | Status: SHIPPED | OUTPATIENT
Start: 2021-11-08 | End: 2022-08-05 | Stop reason: SDUPTHER

## 2021-11-08 RX ORDER — CITALOPRAM 20 MG/1
20 TABLET ORAL DAILY
Qty: 90 TABLET | Refills: 3 | Status: SHIPPED | OUTPATIENT
Start: 2021-11-08 | End: 2022-08-05 | Stop reason: SDUPTHER

## 2021-11-08 ASSESSMENT — PATIENT HEALTH QUESTIONNAIRE - PHQ9
SUM OF ALL RESPONSES TO PHQ9 QUESTIONS 1 AND 2: 0
4. FEELING TIRED OR HAVING LITTLE ENERGY: NOT AT ALL
1. LITTLE INTEREST OR PLEASURE IN DOING THINGS: NOT AT ALL
9. THOUGHTS THAT YOU WOULD BE BETTER OFF DEAD, OR OF HURTING YOURSELF: NOT AT ALL
8. MOVING OR SPEAKING SO SLOWLY THAT OTHER PEOPLE COULD HAVE NOTICED. OR THE OPPOSITE, BEING SO FIGETY OR RESTLESS THAT YOU HAVE BEEN MOVING AROUND A LOT MORE THAN USUAL: NOT AT ALL
2. FEELING DOWN, DEPRESSED, IRRITABLE, OR HOPELESS: NOT AT ALL
5. POOR APPETITE OR OVEREATING: NOT AT ALL
7. TROUBLE CONCENTRATING ON THINGS, SUCH AS READING THE NEWSPAPER OR WATCHING TELEVISION: NOT AT ALL
SUM OF ALL RESPONSES TO PHQ QUESTIONS 1-9: 0
3. TROUBLE FALLING OR STAYING ASLEEP OR SLEEPING TOO MUCH: NOT AT ALL
6. FEELING BAD ABOUT YOURSELF - OR THAT YOU ARE A FAILURE OR HAVE LET YOURSELF OR YOUR FAMILY DOWN: NOT AL ALL

## 2021-11-08 ASSESSMENT — FIBROSIS 4 INDEX: FIB4 SCORE: 1.2

## 2021-11-08 NOTE — PROGRESS NOTES
"Subjective:   CC: annual PE     HPI:     Kena Tai is a 61 y.o. female, established patient of the clinic.     Patient has chronic hyperlipidemia and depression.  She is taking all medication as directed.  She tolerates them well, no side effect reported.  Depression is controlled with citalopram.  Patient does not work with behavioral counseling.  Negative suicidal ideation or plans.    Patient is planning for early group home at the end of the year.    Current medicines (including changes today)  Current Outpatient Medications   Medication Sig Dispense Refill   • atorvastatin (LIPITOR) 20 MG Tab Take 1 Tablet by mouth at bedtime. 90 Tablet 3   • citalopram (CELEXA) 20 MG Tab Take 1 Tablet by mouth every day. 90 Tablet 3     No current facility-administered medications for this visit.     She  has a past medical history of Breast cancer (HCC) and Cancer (HCC) (2001).    I personally reviewed patient's problem list, allergies, medications, family hx, social hx with patient and update EPIC.     REVIEW OF SYSTEMS:  CONSTITUTIONAL:  Denies night sweats, fatigue, malaise, lethargy, fever or chills.  RESPIRATORY:  Denies cough, wheeze, hemoptysis, or shortness of breath.  CARDIOVASCULAR:  Denies chest pains, palpitations, pedal edema     Objective:     /60 (BP Location: Right arm, Patient Position: Sitting, BP Cuff Size: Adult)   Pulse 63   Temp 35.8 °C (96.5 °F) (Temporal)   Ht 1.6 m (5' 3\")   Wt 75.3 kg (166 lb)   SpO2 97%  Body mass index is 29.41 kg/m².    Physical Exam:  Constitutional: awake, alert, in no distress.  Skin: Warm, dry, good turgor, no rashes, bruises, ulcers in visible areas.  Eye: conjunctiva clear, lids neg for edema or lesions.  Neck: Trachea midline, no masses, no thyromegaly. No cervical or supraclavicular lymphadenopathy  Respiratory: Unlabored respiratory effort, lungs clear to auscultation, no wheezes, no rales.  Cardiovascular: Normal S1, S2, no murmur, no pedal " edema.  Abdomen: Soft, non-tender to palpation, active BS, no hernia, no hepatosplenomegaly, negative rebound or guarding.   Psych: Oriented x3, affect and mood wnl, intact judgement and insight.       Assessment and Plan:   The following treatment plan was discussed    1. Current moderate episode of major depressive disorder without prior episode (HCC)  Chronic, controlled with citalopram 40 mg daily.  - citalopram (CELEXA) 20 MG Tab; Take 1 Tablet by mouth every day.  Dispense: 90 Tablet; Refill: 3    2. Pure hypercholesterolemia  Chronic, controlled with Lipitor 20 mg qd, no s/e reported, will continue.    - atorvastatin (LIPITOR) 20 MG Tab; Take 1 Tablet by mouth at bedtime.  Dispense: 90 Tablet; Refill: 3  - dietary modification, exercise, and weight loss.   - avoid alcohol, drugs, tobacco products     3. Prediabetes  Recent labs showed elevated A1c and fasting blood sugar consistent with prediabetes.  Negative familial history of type 2 diabetes.  - Dietary/lifestyle modification and weight loss      4. History of left breast cancer  In remission, no longer follow up with oncology.   Regular mammogram annually   Negative active breast symptoms/masses    5. Need for vaccination  Up-to-date     6. PE (physical exam), annual  General health and wellness counseling provided.      - CBC WITH DIFFERENTIAL; Future  - Comp Metabolic Panel; Future  - HEMOGLOBIN A1C; Future  - Lipid Profile; Future      Ly KELLY Chao M.D.      Followup: Return in about 1 year (around 11/8/2022) for annual PE.    Please note that this dictation was created using voice recognition software. I have made every reasonable attempt to correct obvious errors, but I expect that there are errors of grammar and possibly content that I did not discover before finalizing the note.

## 2022-08-05 ENCOUNTER — OFFICE VISIT (OUTPATIENT)
Dept: MEDICAL GROUP | Age: 62
End: 2022-08-05
Payer: COMMERCIAL

## 2022-08-05 ENCOUNTER — HOSPITAL ENCOUNTER (OUTPATIENT)
Facility: MEDICAL CENTER | Age: 62
End: 2022-08-05
Attending: FAMILY MEDICINE
Payer: COMMERCIAL

## 2022-08-05 VITALS
OXYGEN SATURATION: 97 % | SYSTOLIC BLOOD PRESSURE: 112 MMHG | RESPIRATION RATE: 16 BRPM | DIASTOLIC BLOOD PRESSURE: 64 MMHG | WEIGHT: 166.8 LBS | TEMPERATURE: 96.7 F | BODY MASS INDEX: 29.55 KG/M2 | HEART RATE: 81 BPM | HEIGHT: 63 IN

## 2022-08-05 DIAGNOSIS — E78.00 PURE HYPERCHOLESTEROLEMIA: ICD-10-CM

## 2022-08-05 DIAGNOSIS — D48.9 NEOPLASM OF UNCERTAIN BEHAVIOR: ICD-10-CM

## 2022-08-05 DIAGNOSIS — R11.0 NAUSEA: ICD-10-CM

## 2022-08-05 DIAGNOSIS — F32.1 CURRENT MODERATE EPISODE OF MAJOR DEPRESSIVE DISORDER WITHOUT PRIOR EPISODE (HCC): ICD-10-CM

## 2022-08-05 PROCEDURE — 99213 OFFICE O/P EST LOW 20 MIN: CPT | Mod: 25 | Performed by: FAMILY MEDICINE

## 2022-08-05 PROCEDURE — 11401 EXC TR-EXT B9+MARG 0.6-1 CM: CPT | Performed by: FAMILY MEDICINE

## 2022-08-05 PROCEDURE — 12032 INTMD RPR S/A/T/EXT 2.6-7.5: CPT | Performed by: FAMILY MEDICINE

## 2022-08-05 PROCEDURE — 88305 TISSUE EXAM BY PATHOLOGIST: CPT

## 2022-08-05 RX ORDER — ATORVASTATIN CALCIUM 20 MG/1
20 TABLET, FILM COATED ORAL
Qty: 90 TABLET | Refills: 3 | Status: SHIPPED | OUTPATIENT
Start: 2022-08-05 | End: 2022-08-05 | Stop reason: SDUPTHER

## 2022-08-05 RX ORDER — OMEPRAZOLE 20 MG/1
20 CAPSULE, DELAYED RELEASE ORAL DAILY
Qty: 30 CAPSULE | Refills: 2 | Status: SHIPPED
Start: 2022-08-05 | End: 2022-11-07

## 2022-08-05 RX ORDER — SUCRALFATE 1 G/1
1 TABLET ORAL
Qty: 120 TABLET | Refills: 3 | Status: SHIPPED
Start: 2022-08-05 | End: 2022-11-07

## 2022-08-05 ASSESSMENT — PATIENT HEALTH QUESTIONNAIRE - PHQ9: CLINICAL INTERPRETATION OF PHQ2 SCORE: 0

## 2022-08-05 ASSESSMENT — FIBROSIS 4 INDEX: FIB4 SCORE: 1.22

## 2022-08-05 NOTE — TELEPHONE ENCOUNTER
Received request via: Patient    Was the patient seen in the last year in this department? Yes    Does the patient have an active prescription (recently filled or refills available) for medication(s) requested? Patient needs filled at new pharmacy [facility].

## 2022-08-05 NOTE — PROGRESS NOTES
This medical record contains text that has been entered with the assistance of computer voice recognition and dictation software.  Therefore, it may contain unintended errors in text, spelling, punctuation, or grammar      Chief Complaint   Patient presents with   • Bump     R leg, started a month ago, hx of skin cancer         Kena Tai is a 62 y.o. female here evaluation and management of: Bump on leg      HPI:     1. Pure hypercholesterolemia  Atorvastatin 20 mg p.o. nightly    The patient has been on a statin for years and tolerating this fine. The patient denies any muscle aches, no abdominal pain and no history of elevated liver enzymes.      2. Nausea  NEW UNDIAGNOSED PROBLEM    The patient states that she has been feeling nauseous usually in the morning especially after she drinks her coffee.  This is going on for about 2 to 3 months.  She denies any early satiety no unintentional weight loss, no blood in the stool no dark tarry stool.  She states she will take ibuprofen 600 mg every now and then for headache but she does not take NSAIDs daily.  She denies any abdominal pain no vomiting of blood.      3. Neoplasm of uncertain behavior  Patient is a very pleasant 62-year-old female who presents to clinic with a chief complaint of having an unusual growth on her right lower shin.  She thinks it started 6 months ago that is growing larger becoming more painful or bumping into her close.          Current medicines (including changes today)  Current Outpatient Medications   Medication Sig Dispense Refill   • atorvastatin (LIPITOR) 20 MG Tab Take 1 Tablet by mouth at bedtime. 90 Tablet 3   • omeprazole (PRILOSEC) 20 MG delayed-release capsule Take 1 Capsule by mouth every day. 30 Capsule 2   • sucralfate (CARAFATE) 1 GM Tab Take 1 Tablet by mouth 4 Times a Day,Before Meals and at Bedtime. 120 Tablet 3   • citalopram (CELEXA) 20 MG Tab Take 1 Tablet by mouth every day. (Patient not taking: Reported  "on 2022) 90 Tablet 3     No current facility-administered medications for this visit.     She  has a past medical history of Breast cancer (HCC) and Cancer (HCC) ().  She  has a past surgical history that includes endometrial ablation; gyn surgery; other; breast biopsy (2013); breast biopsy (10/1/2013); lumpectomy; us-needle core bx-breast panel; and pr radiation therapy plan simple.  Social History     Tobacco Use   • Smoking status: Never Smoker   • Smokeless tobacco: Never Used   Vaping Use   • Vaping Use: Never used   Substance Use Topics   • Alcohol use: Yes     Comment: social   • Drug use: No     Social History     Social History Narrative   • Not on file     Family History   Problem Relation Age of Onset   • Diabetes Mother    • Cancer Father         Prostate Cancer     Family Status   Relation Name Status   • Mo  Alive   • Fa   at age 85   • Sis  Alive   • Sis  Alive   • Violette  Alive   • Son  Alive         ROS    The pertinent  ROS findings can be seen in the HPI above.     All other systems reviewed and are negative     Objective:     /64 (BP Location: Right arm, Patient Position: Sitting, BP Cuff Size: Adult)   Pulse 81   Temp 35.9 °C (96.7 °F) (Temporal)   Resp 16   Ht 1.6 m (5' 3\")   Wt 75.7 kg (166 lb 12.8 oz)   SpO2 97%  Body mass index is 29.55 kg/m².      Physical Exam:    Constitutional: Alert, no distress.  Skin:       Excision--- 7 mm, regular erythematous papule with black eschar in the middle on top located on right mid shin          After informed written consent was obtained, using Betadine for cleansing and 1% Lidocaine w- epinephrine for anesthetic, with sterile technique a 10 blade tool was used to obtain and excise  the lesion through dermis wide excision (full thickness) . Intent was to remove entire lesion with margins . Hemostasis was obtained by pressure and wound was  Sutured  With 3.0 Ethilon x4 Plus Vycryl x1. ( one in intermediate layer closure) " Antibiotic dressing is applied, and wound care instructions provided. Be alert for any signs of cutaneous infection. The specimen is labeled and sent to pathology for evaluation. The procedure was well tolerated without complications.      Buried  suture was placed x 1 with vicryl to close dead space. 3.0 nylon superficial interrupted sutures were placed x 4 to approximate wound edge.  Vaseline applied to wound with bandage. Patient tolerated procedure well and there were no complications, blood loss was minimal.     Final repair length (measurement of repaired defect): 4cm      Eye: Equal, round and reactive, conjunctiva clear, lids normal.  ENMT: Lips without lesions, good dentition, oropharynx clear.  Neck: Trachea midline, no masses, no thyromegaly. No cervical or supraclavicular lymphadenopathy.  Respiratory: Unlabored respiratory effort, lungs clear to auscultation, no wheezes, no ronchi.  Cardiovascular: Normal S1, S2, no murmur, no edema  Abdomen: Soft, non-tender, no masses, no hepatosplenomegaly.        Assessment and Plan:   The following treatment plan was discussed      1. Pure hypercholesterolemia    We will obtain new labs to update clinical profile.  Then we will adjust therapy as needed.    - atorvastatin (LIPITOR) 20 MG Tab; Take 1 Tablet by mouth at bedtime.  Dispense: 90 Tablet; Refill: 3  - Comp Metabolic Panel; Future  - Lipid Profile; Future    2. Nausea      We will begin treatment for presumed gastritis  The patient  was also instructed to avoid NSAIDs, avoid coffee and alcohol    If this fails, we will obtain an EGD to visualize the esophagus , stomach and duodenum    - omeprazole (PRILOSEC) 20 MG delayed-release capsule; Take 1 Capsule by mouth every day.  Dispense: 30 Capsule; Refill: 2  - sucralfate (CARAFATE) 1 GM Tab; Take 1 Tablet by mouth 4 Times a Day,Before Meals and at Bedtime.  Dispense: 120 Tablet; Refill: 3    3. Neoplasm of uncertain behavior    Patient tolerated procedure  well  There were no adverse events  Patient was given post procedure precautions     - Pathology Specimen; Future          Instructed to Follow up in clinic or ER for worsening symptoms, difficulty breathing, lack of expected recovery, or should new symptoms or problems arise.    Followup: Return in about 10 days (around 8/15/2022) for Suture Removal with MA.

## 2022-08-06 DIAGNOSIS — D48.9 NEOPLASM OF UNCERTAIN BEHAVIOR: ICD-10-CM

## 2022-08-06 LAB — PATHOLOGY CONSULT NOTE: NORMAL

## 2022-08-08 RX ORDER — CITALOPRAM 20 MG/1
20 TABLET ORAL DAILY
Qty: 90 TABLET | Refills: 3 | Status: SHIPPED | OUTPATIENT
Start: 2022-08-08 | End: 2023-11-20

## 2022-08-08 RX ORDER — ATORVASTATIN CALCIUM 20 MG/1
20 TABLET, FILM COATED ORAL
Qty: 90 TABLET | Refills: 3 | Status: SHIPPED | OUTPATIENT
Start: 2022-08-08 | End: 2023-10-16

## 2022-08-16 ENCOUNTER — OFFICE VISIT (OUTPATIENT)
Dept: MEDICAL GROUP | Age: 62
End: 2022-08-16
Payer: COMMERCIAL

## 2022-08-16 VITALS — HEIGHT: 63 IN | BODY MASS INDEX: 30.09 KG/M2 | WEIGHT: 169.8 LBS

## 2022-08-16 DIAGNOSIS — Z85.3 HISTORY OF LEFT BREAST CANCER: ICD-10-CM

## 2022-08-16 DIAGNOSIS — Z48.02 VISIT FOR SUTURE REMOVAL: ICD-10-CM

## 2022-08-16 PROCEDURE — 99212 OFFICE O/P EST SF 10 MIN: CPT | Performed by: FAMILY MEDICINE

## 2022-08-16 ASSESSMENT — FIBROSIS 4 INDEX: FIB4 SCORE: 1.22

## 2022-08-16 NOTE — PROGRESS NOTES
Kena Tai is a 62 y.o. female here for a Non-Provider Visit for Suture Removal.    Sutures were placed by Dr. Guerrero on date: 8/5/22  Skin is healed: Yes  Provider notified if skin is not healed, or if there is redness, heat, pain, or drainage from incision: yes  Sutures removed.   Mastisol and steristips are placed: No    Advised to use emollient (vaseline, aquaphor, etc.) as needed, avoid peroxide and antibiotic ointment to reduce irritation.     Path report has been reviewed by provider.  Path report has reviewed with patient.

## 2022-08-16 NOTE — LETTER
August 16, 2022        Kena Tai  [address] [place] [address]        Dear Kena:      The patient underwent excision and returns for suture removal.      Sutures removed in normal clean fashion.  There were no adverse events.  We also discussed results of pathology  Pe[facility] sun hours are 10am to 4pm  And future skin examinations along   With self skin exams--which have shown to decrease  Melanoma incidence by 63 %        If you have any questions or concerns, please don't hesitate to call.        Sincerely,        Markus Guerrero M.D.    Electronically Signed

## 2022-08-16 NOTE — PROGRESS NOTES
This medical record contains text that has been entered with the assistance of computer voice recognition and dictation software.  Therefore, it may contain unintended errors in text, spelling, punctuation, or grammar      Chief Complaint   Patient presents with    Follow-Up    Suture / Staple Removal    Lab Results     8/6/22 Biopsy         Kena Tai is a 62 y.o. female here evaluation and management of:       HPI:     1. Visit for suture removal  The patient has not an excision which revealed actinic keratoses on her right lower shin.  She comes in today to discuss she can protect herself from skin cancer in the future.    2. History of left breast cancer  The patient would also like to review her mammogram.    Diagnostic mammogram on August 3, 2022      Current medicines (including changes today)  Current Outpatient Medications   Medication Sig Dispense Refill    atorvastatin (LIPITOR) 20 MG Tab Take 1 Tablet by mouth at bedtime. 90 Tablet 3    citalopram (CELEXA) 20 MG Tab Take 1 Tablet by mouth every day. 90 Tablet 3    omeprazole (PRILOSEC) 20 MG delayed-release capsule Take 1 Capsule by mouth every day. 30 Capsule 2    sucralfate (CARAFATE) 1 GM Tab Take 1 Tablet by mouth 4 Times a Day,Before Meals and at Bedtime. 120 Tablet 3     No current facility-administered medications for this visit.     She  has a past medical history of Breast cancer (HCC) and Cancer (HCC) (2001).  She  has a past surgical history that includes endometrial ablation; gyn surgery; other; breast biopsy (9/23/2013); breast biopsy (10/1/2013); lumpectomy; us-needle core bx-breast panel; and pr radiation therapy plan simple.  Social History     Tobacco Use    Smoking status: Never    Smokeless tobacco: Never   Vaping Use    Vaping Use: Never used   Substance Use Topics    Alcohol use: Yes     Comment: social    Drug use: No     Social History     Social History Narrative    Not on file     Family History   Problem Relation  "Age of Onset    Diabetes Mother     Cancer Father         Prostate Cancer     Family Status   Relation Name Status    Mo  Alive    Fa   at age 85    Sis  Alive    Sis  Alive    Violette  Alive    Son  Alive         ROS    The pertinent  ROS findings can be seen in the HPI above.     All other systems reviewed and are negative     Objective:     Ht 1.6 m (5' 3\")   Wt 77 kg (169 lb 12.8 oz)  Body mass index is 30.08 kg/m².      Physical Exam:    Constitutional: Alert, no distress.  Skin: No suspicious lesions  Eye: Equal, round and reactive, conjunctiva clear, lids normal.  ENMT: Lips without lesions, good dentition, oropharynx clear.  Neck: Trachea midline, no masses, no thyromegaly. No cervical or supraclavicular lymphadenopathy.  Respiratory: Unlabored respiratory effort, lungs clear to auscultation, no wheezes, no ronchi.  Cardiovascular: Normal S1, S2, no murmur, no edema  Abdomen: Soft, non-tender, no masses, no hepatosplenomegaly.        Assessment and Plan:   The following treatment plan was discussed      1. Visit for suture removal    Sutures removed in normal clean fashion.  There were no adverse events.  We also discussed results of pathology  Pe[facility] sun hours are 10am to 4pm  And future skin examinations along   With self skin exams--which have shown to decrease  Melanoma incidence by 63 %     2. History of left breast cancer    We reviewed the results and discussed that she should  Repeat diagnostic mammogram in 1 year.              Instructed to Follow up in clinic or ER for worsening symptoms, difficulty breathing, lack of expected recovery, or should new symptoms or problems arise.    Followup: Return in about 3 months (around 2022) for Reevaluation, With PCP.               "

## 2022-11-01 ENCOUNTER — HOSPITAL ENCOUNTER (OUTPATIENT)
Dept: LAB | Facility: MEDICAL CENTER | Age: 62
End: 2022-11-01
Attending: FAMILY MEDICINE
Payer: COMMERCIAL

## 2022-11-01 DIAGNOSIS — Z00.00 PE (PHYSICAL EXAM), ANNUAL: ICD-10-CM

## 2022-11-01 LAB
ALBUMIN SERPL BCP-MCNC: 4.2 G/DL (ref 3.2–4.9)
ALBUMIN/GLOB SERPL: 1.4 G/DL
ALP SERPL-CCNC: 72 U/L (ref 30–99)
ALT SERPL-CCNC: 13 U/L (ref 2–50)
ANION GAP SERPL CALC-SCNC: 10 MMOL/L (ref 7–16)
AST SERPL-CCNC: 20 U/L (ref 12–45)
BASOPHILS # BLD AUTO: 1.5 % (ref 0–1.8)
BASOPHILS # BLD: 0.07 K/UL (ref 0–0.12)
BILIRUB SERPL-MCNC: 1.9 MG/DL (ref 0.1–1.5)
BUN SERPL-MCNC: 12 MG/DL (ref 8–22)
CALCIUM SERPL-MCNC: 8.9 MG/DL (ref 8.5–10.5)
CHLORIDE SERPL-SCNC: 104 MMOL/L (ref 96–112)
CHOLEST SERPL-MCNC: 183 MG/DL (ref 100–199)
CO2 SERPL-SCNC: 25 MMOL/L (ref 20–33)
CREAT SERPL-MCNC: 0.68 MG/DL (ref 0.5–1.4)
EOSINOPHIL # BLD AUTO: 0.14 K/UL (ref 0–0.51)
EOSINOPHIL NFR BLD: 3 % (ref 0–6.9)
ERYTHROCYTE [DISTWIDTH] IN BLOOD BY AUTOMATED COUNT: 45.8 FL (ref 35.9–50)
EST. AVERAGE GLUCOSE BLD GHB EST-MCNC: 114 MG/DL
FASTING STATUS PATIENT QL REPORTED: NORMAL
GFR SERPLBLD CREATININE-BSD FMLA CKD-EPI: 98 ML/MIN/1.73 M 2
GLOBULIN SER CALC-MCNC: 3 G/DL (ref 1.9–3.5)
GLUCOSE SERPL-MCNC: 99 MG/DL (ref 65–99)
HBA1C MFR BLD: 5.6 % (ref 4–5.6)
HCT VFR BLD AUTO: 44.9 % (ref 37–47)
HDLC SERPL-MCNC: 61 MG/DL
HGB BLD-MCNC: 14.3 G/DL (ref 12–16)
IMM GRANULOCYTES # BLD AUTO: 0.01 K/UL (ref 0–0.11)
IMM GRANULOCYTES NFR BLD AUTO: 0.2 % (ref 0–0.9)
LDLC SERPL CALC-MCNC: 94 MG/DL
LYMPHOCYTES # BLD AUTO: 1.42 K/UL (ref 1–4.8)
LYMPHOCYTES NFR BLD: 30 % (ref 22–41)
MCH RBC QN AUTO: 28.7 PG (ref 27–33)
MCHC RBC AUTO-ENTMCNC: 31.8 G/DL (ref 33.6–35)
MCV RBC AUTO: 90.2 FL (ref 81.4–97.8)
MONOCYTES # BLD AUTO: 0.3 K/UL (ref 0–0.85)
MONOCYTES NFR BLD AUTO: 6.3 % (ref 0–13.4)
NEUTROPHILS # BLD AUTO: 2.8 K/UL (ref 2–7.15)
NEUTROPHILS NFR BLD: 59 % (ref 44–72)
NRBC # BLD AUTO: 0 K/UL
NRBC BLD-RTO: 0 /100 WBC
PLATELET # BLD AUTO: 242 K/UL (ref 164–446)
PMV BLD AUTO: 10.1 FL (ref 9–12.9)
POTASSIUM SERPL-SCNC: 3.9 MMOL/L (ref 3.6–5.5)
PROT SERPL-MCNC: 7.2 G/DL (ref 6–8.2)
RBC # BLD AUTO: 4.98 M/UL (ref 4.2–5.4)
SODIUM SERPL-SCNC: 139 MMOL/L (ref 135–145)
TRIGL SERPL-MCNC: 140 MG/DL (ref 0–149)
WBC # BLD AUTO: 4.7 K/UL (ref 4.8–10.8)

## 2022-11-01 PROCEDURE — 83036 HEMOGLOBIN GLYCOSYLATED A1C: CPT

## 2022-11-01 PROCEDURE — 80061 LIPID PANEL: CPT

## 2022-11-01 PROCEDURE — 85025 COMPLETE CBC W/AUTO DIFF WBC: CPT

## 2022-11-01 PROCEDURE — 36415 COLL VENOUS BLD VENIPUNCTURE: CPT

## 2022-11-01 PROCEDURE — 80053 COMPREHEN METABOLIC PANEL: CPT

## 2022-11-06 SDOH — ECONOMIC STABILITY: TRANSPORTATION INSECURITY
IN THE PAST 12 MONTHS, HAS THE LACK OF TRANSPORTATION KEPT YOU FROM MEDICAL APPOINTMENTS OR FROM GETTING MEDICATIONS?: NO

## 2022-11-06 SDOH — ECONOMIC STABILITY: TRANSPORTATION INSECURITY
IN THE PAST 12 MONTHS, HAS LACK OF RELIABLE TRANSPORTATION KEPT YOU FROM MEDICAL APPOINTMENTS, MEETINGS, WORK OR FROM GETTING THINGS NEEDED FOR DAILY LIVING?: NO

## 2022-11-06 SDOH — ECONOMIC STABILITY: HOUSING INSECURITY
IN THE LAST 12 MONTHS, WAS THERE A TIME WHEN YOU DID NOT HAVE A STEADY PLACE TO SLEEP OR SLEPT IN A SHELTER (INCLUDING NOW)?: NO

## 2022-11-06 SDOH — HEALTH STABILITY: PHYSICAL HEALTH: ON AVERAGE, HOW MANY DAYS PER WEEK DO YOU ENGAGE IN MODERATE TO STRENUOUS EXERCISE (LIKE A BRISK WALK)?: 3 DAYS

## 2022-11-06 SDOH — ECONOMIC STABILITY: FOOD INSECURITY: WITHIN THE PAST 12 MONTHS, YOU WORRIED THAT YOUR FOOD WOULD RUN OUT BEFORE YOU GOT MONEY TO BUY MORE.: NEVER TRUE

## 2022-11-06 SDOH — ECONOMIC STABILITY: TRANSPORTATION INSECURITY
IN THE PAST 12 MONTHS, HAS LACK OF TRANSPORTATION KEPT YOU FROM MEETINGS, WORK, OR FROM GETTING THINGS NEEDED FOR DAILY LIVING?: NO

## 2022-11-06 SDOH — ECONOMIC STABILITY: INCOME INSECURITY: IN THE LAST 12 MONTHS, WAS THERE A TIME WHEN YOU WERE NOT ABLE TO PAY THE MORTGAGE OR RENT ON TIME?: NO

## 2022-11-06 SDOH — HEALTH STABILITY: MENTAL HEALTH
STRESS IS WHEN SOMEONE FEELS TENSE, NERVOUS, ANXIOUS, OR CAN'T SLEEP AT NIGHT BECAUSE THEIR MIND IS TROUBLED. HOW STRESSED ARE YOU?: TO SOME EXTENT

## 2022-11-06 SDOH — HEALTH STABILITY: PHYSICAL HEALTH: ON AVERAGE, HOW MANY MINUTES DO YOU ENGAGE IN EXERCISE AT THIS LEVEL?: 20 MIN

## 2022-11-06 SDOH — ECONOMIC STABILITY: FOOD INSECURITY: WITHIN THE PAST 12 MONTHS, THE FOOD YOU BOUGHT JUST DIDN'T LAST AND YOU DIDN'T HAVE MONEY TO GET MORE.: NEVER TRUE

## 2022-11-06 SDOH — ECONOMIC STABILITY: INCOME INSECURITY: HOW HARD IS IT FOR YOU TO PAY FOR THE VERY BASICS LIKE FOOD, HOUSING, MEDICAL CARE, AND HEATING?: NOT HARD AT ALL

## 2022-11-06 SDOH — ECONOMIC STABILITY: HOUSING INSECURITY: IN THE LAST 12 MONTHS, HOW MANY PLACES HAVE YOU LIVED?: 1

## 2022-11-06 ASSESSMENT — SOCIAL DETERMINANTS OF HEALTH (SDOH)
HOW OFTEN DO YOU ATTENT MEETINGS OF THE CLUB OR ORGANIZATION YOU BELONG TO?: NEVER
HOW MANY DRINKS CONTAINING ALCOHOL DO YOU HAVE ON A TYPICAL DAY WHEN YOU ARE DRINKING: 1 OR 2
HOW OFTEN DO YOU ATTEND CHURCH OR RELIGIOUS SERVICES?: 1 TO 4 TIMES PER YEAR
DO YOU BELONG TO ANY CLUBS OR ORGANIZATIONS SUCH AS CHURCH GROUPS UNIONS, FRATERNAL OR ATHLETIC GROUPS, OR SCHOOL GROUPS?: NO
HOW OFTEN DO YOU ATTENT MEETINGS OF THE CLUB OR ORGANIZATION YOU BELONG TO?: NEVER
IN A TYPICAL WEEK, HOW MANY TIMES DO YOU TALK ON THE PHONE WITH FAMILY, FRIENDS, OR NEIGHBORS?: MORE THAN THREE TIMES A WEEK
HOW OFTEN DO YOU HAVE SIX OR MORE DRINKS ON ONE OCCASION: NEVER
HOW OFTEN DO YOU HAVE A DRINK CONTAINING ALCOHOL: MONTHLY OR LESS
HOW OFTEN DO YOU ATTEND CHURCH OR RELIGIOUS SERVICES?: 1 TO 4 TIMES PER YEAR
HOW OFTEN DO YOU GET TOGETHER WITH FRIENDS OR RELATIVES?: ONCE A WEEK
DO YOU BELONG TO ANY CLUBS OR ORGANIZATIONS SUCH AS CHURCH GROUPS UNIONS, FRATERNAL OR ATHLETIC GROUPS, OR SCHOOL GROUPS?: NO
WITHIN THE PAST 12 MONTHS, YOU WORRIED THAT YOUR FOOD WOULD RUN OUT BEFORE YOU GOT THE MONEY TO BUY MORE: NEVER TRUE
IN A TYPICAL WEEK, HOW MANY TIMES DO YOU TALK ON THE PHONE WITH FAMILY, FRIENDS, OR NEIGHBORS?: MORE THAN THREE TIMES A WEEK
HOW HARD IS IT FOR YOU TO PAY FOR THE VERY BASICS LIKE FOOD, HOUSING, MEDICAL CARE, AND HEATING?: NOT HARD AT ALL
HOW OFTEN DO YOU GET TOGETHER WITH FRIENDS OR RELATIVES?: ONCE A WEEK

## 2022-11-06 ASSESSMENT — LIFESTYLE VARIABLES
HOW MANY STANDARD DRINKS CONTAINING ALCOHOL DO YOU HAVE ON A TYPICAL DAY: 1 OR 2
AUDIT-C TOTAL SCORE: 1
SKIP TO QUESTIONS 9-10: 1
HOW OFTEN DO YOU HAVE A DRINK CONTAINING ALCOHOL: MONTHLY OR LESS
HOW OFTEN DO YOU HAVE SIX OR MORE DRINKS ON ONE OCCASION: NEVER

## 2022-11-07 ENCOUNTER — OFFICE VISIT (OUTPATIENT)
Dept: MEDICAL GROUP | Age: 62
End: 2022-11-07
Payer: COMMERCIAL

## 2022-11-07 ENCOUNTER — HOSPITAL ENCOUNTER (OUTPATIENT)
Facility: MEDICAL CENTER | Age: 62
End: 2022-11-07
Attending: FAMILY MEDICINE
Payer: COMMERCIAL

## 2022-11-07 VITALS
BODY MASS INDEX: 29.23 KG/M2 | TEMPERATURE: 96.8 F | HEART RATE: 84 BPM | SYSTOLIC BLOOD PRESSURE: 122 MMHG | DIASTOLIC BLOOD PRESSURE: 64 MMHG | HEIGHT: 63 IN | WEIGHT: 165 LBS | OXYGEN SATURATION: 96 %

## 2022-11-07 DIAGNOSIS — R82.90 ABNORMAL URINALYSIS: ICD-10-CM

## 2022-11-07 DIAGNOSIS — E55.9 VITAMIN D INSUFFICIENCY: ICD-10-CM

## 2022-11-07 DIAGNOSIS — Z23 NEED FOR VACCINATION: ICD-10-CM

## 2022-11-07 DIAGNOSIS — N32.81 OAB (OVERACTIVE BLADDER): ICD-10-CM

## 2022-11-07 DIAGNOSIS — E78.00 PURE HYPERCHOLESTEROLEMIA: ICD-10-CM

## 2022-11-07 DIAGNOSIS — F32.1 CURRENT MODERATE EPISODE OF MAJOR DEPRESSIVE DISORDER WITHOUT PRIOR EPISODE (HCC): ICD-10-CM

## 2022-11-07 DIAGNOSIS — Z00.00 PE (PHYSICAL EXAM), ANNUAL: Primary | ICD-10-CM

## 2022-11-07 PROBLEM — M72.2 PLANTAR FASCIITIS, BILATERAL: Status: RESOLVED | Noted: 2019-10-15 | Resolved: 2022-11-07

## 2022-11-07 LAB
APPEARANCE UR: CLEAR
BILIRUB UR STRIP-MCNC: NEGATIVE MG/DL
COLOR UR AUTO: YELLOW
GLUCOSE UR STRIP.AUTO-MCNC: NEGATIVE MG/DL
KETONES UR STRIP.AUTO-MCNC: NEGATIVE MG/DL
LEUKOCYTE ESTERASE UR QL STRIP.AUTO: NORMAL
NITRITE UR QL STRIP.AUTO: NEGATIVE
PH UR STRIP.AUTO: 5.5 [PH] (ref 5–8)
PROT UR QL STRIP: NEGATIVE MG/DL
RBC UR QL AUTO: NORMAL
SP GR UR STRIP.AUTO: 1.03
UROBILINOGEN UR STRIP-MCNC: 0.2 MG/DL

## 2022-11-07 PROCEDURE — 81001 URINALYSIS AUTO W/SCOPE: CPT

## 2022-11-07 PROCEDURE — 99396 PREV VISIT EST AGE 40-64: CPT | Mod: 25 | Performed by: FAMILY MEDICINE

## 2022-11-07 PROCEDURE — 90471 IMMUNIZATION ADMIN: CPT | Performed by: FAMILY MEDICINE

## 2022-11-07 PROCEDURE — 81002 URINALYSIS NONAUTO W/O SCOPE: CPT | Performed by: FAMILY MEDICINE

## 2022-11-07 PROCEDURE — 90686 IIV4 VACC NO PRSV 0.5 ML IM: CPT | Performed by: FAMILY MEDICINE

## 2022-11-07 RX ORDER — SOLIFENACIN SUCCINATE 5 MG/1
5 TABLET, FILM COATED ORAL DAILY
Qty: 90 TABLET | Refills: 3 | Status: SHIPPED
Start: 2022-11-07 | End: 2023-05-01

## 2022-11-07 ASSESSMENT — PATIENT HEALTH QUESTIONNAIRE - PHQ9
SUM OF ALL RESPONSES TO PHQ QUESTIONS 1-9: 5
1. LITTLE INTEREST OR PLEASURE IN DOING THINGS: NOT AT ALL
2. FEELING DOWN, DEPRESSED, IRRITABLE, OR HOPELESS: SEVERAL DAYS
4. FEELING TIRED OR HAVING LITTLE ENERGY: NOT AT ALL
5. POOR APPETITE OR OVEREATING: NEARLY EVERY DAY
7. TROUBLE CONCENTRATING ON THINGS, SUCH AS READING THE NEWSPAPER OR WATCHING TELEVISION: NOT AT ALL
8. MOVING OR SPEAKING SO SLOWLY THAT OTHER PEOPLE COULD HAVE NOTICED. OR THE OPPOSITE, BEING SO FIGETY OR RESTLESS THAT YOU HAVE BEEN MOVING AROUND A LOT MORE THAN USUAL: NOT AT ALL
6. FEELING BAD ABOUT YOURSELF - OR THAT YOU ARE A FAILURE OR HAVE LET YOURSELF OR YOUR FAMILY DOWN: NOT AL ALL
9. THOUGHTS THAT YOU WOULD BE BETTER OFF DEAD, OR OF HURTING YOURSELF: NOT AT ALL
3. TROUBLE FALLING OR STAYING ASLEEP OR SLEEPING TOO MUCH: SEVERAL DAYS
SUM OF ALL RESPONSES TO PHQ9 QUESTIONS 1 AND 2: 1

## 2022-11-07 ASSESSMENT — FIBROSIS 4 INDEX: FIB4 SCORE: 1.44

## 2022-11-07 NOTE — PROGRESS NOTES
"Subjective:   CC: annual PE     HPI:     Kena Tai is a 62 y.o. female, established patient of the clinic.     Doing well, takes all medications as directed.   Plantar fasciitis has resolved.   Had skin lesion removed from right leg a few months ago: AK, healing well.     She complained of having to urinate every 30 minutes. She limits fluid intake to control this condition. She complained of malodorous urine without dysuria. Symptoms are bothersome and reduces quality of life. It is difficult for her to work due to symptoms.     Current medicines (including changes today)  Current Outpatient Medications   Medication Sig Dispense Refill    solifenacin (VESICARE) 5 MG tablet Take 1 Tablet by mouth every day. 90 Tablet 3    atorvastatin (LIPITOR) 20 MG Tab Take 1 Tablet by mouth at bedtime. 90 Tablet 3    citalopram (CELEXA) 20 MG Tab Take 1 Tablet by mouth every day. 90 Tablet 3     No current facility-administered medications for this visit.     She  has a past medical history of Breast cancer (HCC) and Cancer (HCC) (2001).    I reviewed patient's problem list, allergies, medications, family hx, social hx with patient and update EPIC.        Objective:     /64 (BP Location: Left arm, Patient Position: Sitting, BP Cuff Size: Adult)   Pulse 84   Temp 36 °C (96.8 °F) (Temporal)   Ht 1.6 m (5' 3\")   Wt 74.8 kg (165 lb)   SpO2 96%  Body mass index is 29.23 kg/m².    Physical Exam:  Constitutional: awake, alert, in no distress.  Skin: Warm, dry, good turgor, no rashes, bruises, ulcers in visible areas.  Eye: conjunctiva clear, lids neg for edema or lesions.  Neck: Trachea midline, no masses, no thyromegaly. No cervical or supraclavicular lymphadenopathy  Respiratory: Unlabored respiratory effort, lungs clear to auscultation, no wheezes, no rales.  Cardiovascular: Normal S1, S2, no murmur, no pedal edema.  Abdomen: Soft, non-tender to palpation, active BS, no hernia, no hepatosplenomegaly, " negative rebound or guarding.   Psych: Oriented x3, affect and mood wnl, intact judgement and insight.       Assessment and Plan:   The following treatment plan was discussed    1. Pure hypercholesterolemia  Chronic, controlled with Lipitor 20 mg qd, no s/e reported, will continue.      2. Current moderate episode of major depressive disorder without prior episode (HCC)  Chronic, controlled with Celexa 20 mg qd, no s/e reported, will continue.      3. OAB (overactive bladder)  History and exam are concerning for OAB.   - solifenacin (VESICARE) 5 MG tablet; Take 1 Tablet by mouth every day.  Dispense: 90 Tablet; Refill: 3  - urine dip: negative for infection, lysed blood found, will send for microscopic urinalysis.     4. Vitamin D insufficiency  - 2000 units of vitamin D3 daily  - VITAMIN D,25 HYDROXY (DEFICIENCY); Future    5. Need for vaccination  - INFLUENZA VACCINE QUAD INJ (PF)    6. PE (physical exam), annual  General health and wellness counseling provided.      Will repeat pap next year.   Discussed bone health: recommended vitamin D and calcium.   - CBC WITH DIFFERENTIAL; Future  - Comp Metabolic Panel; Future  - HEMOGLOBIN A1C; Future  - Lipid Profile; Future      Ly KELLY Chao M.D.      Followup: Return in about 1 year (around 11/7/2023) for Annual wellness visit.    Please note that this dictation was created using voice recognition software. I have made every reasonable attempt to correct obvious errors, but I expect that there are errors of grammar and possibly content that I did not discover before finalizing the note.

## 2022-11-08 DIAGNOSIS — R30.0 DYSURIA: ICD-10-CM

## 2022-11-08 LAB
APPEARANCE UR: ABNORMAL
BACTERIA #/AREA URNS HPF: NEGATIVE /HPF
BILIRUB UR QL STRIP.AUTO: NEGATIVE
COLOR UR: YELLOW
EPI CELLS #/AREA URNS HPF: ABNORMAL /HPF
GLUCOSE UR STRIP.AUTO-MCNC: NEGATIVE MG/DL
HYALINE CASTS #/AREA URNS LPF: ABNORMAL /LPF
KETONES UR STRIP.AUTO-MCNC: NEGATIVE MG/DL
LEUKOCYTE ESTERASE UR QL STRIP.AUTO: ABNORMAL
MICRO URNS: ABNORMAL
NITRITE UR QL STRIP.AUTO: NEGATIVE
PH UR STRIP.AUTO: 5 [PH] (ref 5–8)
PROT UR QL STRIP: NEGATIVE MG/DL
RBC # URNS HPF: ABNORMAL /HPF
RBC UR QL AUTO: NEGATIVE
SP GR UR STRIP.AUTO: 1.02
UROBILINOGEN UR STRIP.AUTO-MCNC: 0.2 MG/DL
WBC #/AREA URNS HPF: ABNORMAL /HPF

## 2022-11-08 RX ORDER — SULFAMETHOXAZOLE AND TRIMETHOPRIM 800; 160 MG/1; MG/1
1 TABLET ORAL 2 TIMES DAILY
Qty: 6 TABLET | Refills: 0 | Status: SHIPPED | OUTPATIENT
Start: 2022-11-08 | End: 2022-11-11

## 2023-04-07 ENCOUNTER — TELEPHONE (OUTPATIENT)
Dept: MEDICAL GROUP | Age: 63
End: 2023-04-07

## 2023-04-07 NOTE — TELEPHONE ENCOUNTER
Patient stopped by the office to drop off her updated Immunization Record. She states Dr. Chao asked her to drop it off. It is scanned into her

## 2023-05-01 ENCOUNTER — OFFICE VISIT (OUTPATIENT)
Dept: MEDICAL GROUP | Age: 63
End: 2023-05-01
Payer: COMMERCIAL

## 2023-05-01 VITALS
OXYGEN SATURATION: 94 % | HEART RATE: 85 BPM | BODY MASS INDEX: 28.35 KG/M2 | WEIGHT: 160 LBS | TEMPERATURE: 98.1 F | SYSTOLIC BLOOD PRESSURE: 124 MMHG | DIASTOLIC BLOOD PRESSURE: 76 MMHG | HEIGHT: 63 IN

## 2023-05-01 DIAGNOSIS — N32.81 OAB (OVERACTIVE BLADDER): ICD-10-CM

## 2023-05-01 DIAGNOSIS — H69.93 EUSTACHIAN TUBE DYSFUNCTION, BILATERAL: ICD-10-CM

## 2023-05-01 DIAGNOSIS — H61.23 BILATERAL IMPACTED CERUMEN: ICD-10-CM

## 2023-05-01 DIAGNOSIS — F32.1 CURRENT MODERATE EPISODE OF MAJOR DEPRESSIVE DISORDER WITHOUT PRIOR EPISODE (HCC): ICD-10-CM

## 2023-05-01 DIAGNOSIS — J30.9 ALLERGIC RHINITIS, UNSPECIFIED SEASONALITY, UNSPECIFIED TRIGGER: ICD-10-CM

## 2023-05-01 PROCEDURE — 99214 OFFICE O/P EST MOD 30 MIN: CPT | Mod: 25 | Performed by: FAMILY MEDICINE

## 2023-05-01 PROCEDURE — 69210 REMOVE IMPACTED EAR WAX UNI: CPT | Mod: 50 | Performed by: FAMILY MEDICINE

## 2023-05-01 RX ORDER — METHYLPREDNISOLONE 4 MG/1
TABLET ORAL
Qty: 21 TABLET | Refills: 0 | Status: SHIPPED
Start: 2023-05-01 | End: 2023-11-10

## 2023-05-01 RX ORDER — TAMARIND SEED/TURMERIC EXTRACT 250 MG
TABLET ORAL
COMMUNITY
Start: 2023-03-31

## 2023-05-01 RX ORDER — ASCORBIC ACID 100 MG
TABLET,CHEWABLE ORAL
COMMUNITY
Start: 2023-03-31

## 2023-05-01 ASSESSMENT — PATIENT HEALTH QUESTIONNAIRE - PHQ9
CLINICAL INTERPRETATION OF PHQ2 SCORE: 3
SUM OF ALL RESPONSES TO PHQ QUESTIONS 1-9: 3
5. POOR APPETITE OR OVEREATING: 0 - NOT AT ALL

## 2023-05-01 ASSESSMENT — FIBROSIS 4 INDEX: FIB4 SCORE: 1.44

## 2023-05-01 NOTE — PROGRESS NOTES
Subjective:   CC: Dizziness, mental health follow-up    HPI:     Kena Tai is a 62 y.o. female, established patient of the clinic.  Patient has the following    Intermittent dizziness without vertigo, onset about 4 weeks ago.  Worse with standing or when she tries to get out of bed.  Patient has been trying to increase fluid intake without major changes.  She also complains of dizziness when sitting still and bilateral ear fullness, left worse than right.  There is no tinnitus or reduced hearing.  Symptoms usually last for couple hours  Negative fever, chills, ear pain, congestion, rhinorrhea, cough, sore throat, shortness of breath, dyspnea on exertion, neurological symptoms, visual changes, nausea, vomiting, history of head trauma.  Patient is taking citalopram 20 mg daily for depression.  She is experiencing increasing stress related to her relationship with her son who is now living in [place].  Patient wishes to be referred to behavioral counseling. Negative SI/HI.   Patient was taking Vesicare 5 mg daily for overactive bladder for 90 days.  Vesicare did help with her symptoms.  However, she thinks that she is able to control her symptoms without medication at this time.      Current medicines (including changes today)  Current Outpatient Medications   Medication Sig Dispense Refill    methylPREDNISolone (MEDROL DOSEPAK) 4 MG Tablet Therapy Pack 6 tabs day 1, 5 tabs day 2, 4 tabs day 3, 3 tabs day 4, 2 tabs day 5, 1 tab day 6. Take with food. 21 Tablet 0    Collagen-Boron-Hyaluronic Acid (ELVIN TYPE II COLLAGEN/HA & B) 40-5-3.3 MG Tab       Cholecalciferol (VITAMIN D3) 25 MCG (1000 UT) Cap       CALCIUM PO       Ascorbic Acid (VITAMIN C) 100 MG Chew Tab       atorvastatin (LIPITOR) 20 MG Tab Take 1 Tablet by mouth at bedtime. 90 Tablet 3    citalopram (CELEXA) 20 MG Tab Take 1 Tablet by mouth every day. 90 Tablet 3     No current facility-administered medications for this visit.     She  has a  "past medical history of Breast cancer (HCC) and Cancer (HCC) (2001).    I reviewed patient's problem list, allergies, medications, family hx, social hx with patient and update EPIC.        Objective:     /76 (BP Location: Left arm, Patient Position: Sitting, BP Cuff Size: Adult)   Pulse 85   Temp 36.7 °C (98.1 °F) (Temporal)   Ht 1.6 m (5' 3\")   Wt 72.6 kg (160 lb)   SpO2 94%  Body mass index is 28.34 kg/m².    Physical Exam:  Constitutional: awake, alert, in no distress.  Skin: Warm, dry, good turgor, no rashes, bruises, ulcers in visible areas.  Eye: conjunctiva clear, lids neg for edema or lesions.  ENMT: Bilateral cerumen impaction, bulging/erythematous TMs bilaterally without effusion.  No and congested nasal mucosa with inferior nasal turbinate hypertrophy. Lips without lesions, good dentition, hyperemic posterior oropharynx.  Neck: Trachea midline, no masses, no thyromegaly. No cervical or supraclavicular lymphadenopathy  Respiratory: Unlabored respiratory effort, lungs clear to auscultation, no wheezes, no rales.  Cardiovascular: Normal S1, S2, no murmur, no pedal edema.  Psych: Oriented x3, affect and mood wnl, intact judgement and insight.       Assessment and Plan:   The following treatment plan was discussed    1. Eustachian tube dysfunction, bilateral  History and exam are concerning for eustachian dysfunction.  - methylPREDNISolone (MEDROL DOSEPAK) 4 MG Tablet Therapy Pack; 6 tabs day 1, 5 tabs day 2, 4 tabs day 3, 3 tabs day 4, 2 tabs day 5, 1 tab day 6. Take with food.  Dispense: 21 Tablet; Refill: 0    2. Allergic rhinitis, unspecified seasonality, unspecified trigger  History and exam are concerning for allergic rhinitis  - Nasal saline irrigation 2-3 times per day to reduce allergen load  - Over-the-counter nasal steroid (Flonase, NasoNex, or Nasacort)   - Over-the-counter oral anti-histamine PRN (Claritin, Allegra, or Zyrtec)  - Follow up PRN     3. Current moderate episode of major " depressive disorder without prior episode (HCC)  Chronic, partially controlled with citalopram 20 mg daily   Patient is dealing with increased social stress and wishes to be referred to counseling.  -Continue citalopram 20 mg daily  - Referral to Behavioral Health  -Exercises as tolerated.    4. OAB (overactive bladder)  Chronic, mild, controlled with behavioral/lifestyle modification.  We will continue to monitor.    5. Bilateral impacted cerumen  - curettage     I personally removed ear wax from both ears using a lighted curette. Patient tolerated the procedure well, no immediate complication noted.        Sarah Chao M.D.      Followup: Return in about 6 months (around 11/1/2023) for Multiple issues.    Please note that this dictation was created using voice recognition software. I have made every reasonable attempt to correct obvious errors, but I expect that there are errors of grammar and possibly content that I did not discover before finalizing the note.

## 2023-05-02 ENCOUNTER — PATIENT MESSAGE (OUTPATIENT)
Dept: MEDICAL GROUP | Age: 63
End: 2023-05-02
Payer: COMMERCIAL

## 2023-05-02 DIAGNOSIS — Z12.31 ENCOUNTER FOR SCREENING MAMMOGRAM FOR BREAST CANCER: ICD-10-CM

## 2023-05-02 DIAGNOSIS — Z78.0 ASYMPTOMATIC POSTMENOPAUSAL STATE: ICD-10-CM

## 2023-07-10 ENCOUNTER — DOCUMENTATION (OUTPATIENT)
Dept: BEHAVIORAL HEALTH | Facility: CLINIC | Age: 63
End: 2023-07-10

## 2023-07-10 ENCOUNTER — OFFICE VISIT (OUTPATIENT)
Dept: BEHAVIORAL HEALTH | Facility: CLINIC | Age: 63
End: 2023-07-10
Payer: COMMERCIAL

## 2023-07-10 DIAGNOSIS — F32.1 CURRENT MODERATE EPISODE OF MAJOR DEPRESSIVE DISORDER WITHOUT PRIOR EPISODE (HCC): ICD-10-CM

## 2023-07-10 PROCEDURE — 90791 PSYCH DIAGNOSTIC EVALUATION: CPT | Performed by: MARRIAGE & FAMILY THERAPIST

## 2023-07-10 ASSESSMENT — PATIENT HEALTH QUESTIONNAIRE - PHQ9
CLINICAL INTERPRETATION OF PHQ2 SCORE: 5
5. POOR APPETITE OR OVEREATING: 0 - NOT AT ALL
SUM OF ALL RESPONSES TO PHQ QUESTIONS 1-9: 9

## 2023-07-10 ASSESSMENT — ANXIETY QUESTIONNAIRES
2. NOT BEING ABLE TO STOP OR CONTROL WORRYING: MORE THAN HALF THE DAYS
5. BEING SO RESTLESS THAT IT IS HARD TO SIT STILL: NOT AT ALL
IF YOU CHECKED OFF ANY PROBLEMS ON THIS QUESTIONNAIRE, HOW DIFFICULT HAVE THESE PROBLEMS MADE IT FOR YOU TO DO YOUR WORK, TAKE CARE OF THINGS AT HOME, OR GET ALONG WITH OTHER PEOPLE: SOMEWHAT DIFFICULT
GAD7 TOTAL SCORE: 10
3. WORRYING TOO MUCH ABOUT DIFFERENT THINGS: MORE THAN HALF THE DAYS
1. FEELING NERVOUS, ANXIOUS, OR ON EDGE: SEVERAL DAYS
4. TROUBLE RELAXING: SEVERAL DAYS
7. FEELING AFRAID AS IF SOMETHING AWFUL MIGHT HAPPEN: MORE THAN HALF THE DAYS
6. BECOMING EASILY ANNOYED OR IRRITABLE: MORE THAN HALF THE DAYS

## 2023-07-10 NOTE — LETTER
The following plan is in draft form.  Please refer to the current version for the most up-to-date information.                Behavioral Health - Outpatient 07/10/23   Effective from: 7/10/2023  Effective to: 1/10/2024    Draft  Plan ID: [id number]               Participants       You: Kena    Your team: Therapist DORI Gomez (Therapist)          Patient Demographics       Patient Name  Kena Lopez Legal Sex  Female   1960 SSN  [id number] Address  [address] [place] [id number] Phone  [phone] (Home) *Preferred*  [phone] (Mobile)          Problem List as of 7/10/2023 Date Reviewed: 7/10/2023            ICD-10-CM Priority Class Noted - Resolved    RESOLVED: Mammographic microcalcification R92.0   2013 - 2018    History of left breast cancer Z85.3   10/1/2013 - Present    Overview Addendum 3/16/2016  4:13 PM by Davina Grimes M.D.     Left breast cancer DCIS with ER positive and AL positive. Wire localized excisional biopsy with Dr Hoover on 13 and Lumpectomy by Dr Hoover on 10/1/13. She received radiation therapy for 3 months.  Patient declined to have Tamoxifen.   She was clear from oncologist, Dr Nagy ([facility]) and she is on remission.  Screening Mammogram in  and  were negative for malignancy.         Current moderate episode of major depressive disorder without prior episode (HCC) F32.1   3/16/2016 - Present    RESOLVED: Chronic fatigue R53.82   3/16/2016 - 3/12/2018    Pure hypercholesterolemia E78.00   4/10/2016 - Present    RESOLVED: Lateral epicondylitis of right elbow M77.11   10/24/2017 - 2020    RESOLVED: Impacted cerumen of right ear H61.21   3/12/2018 - 2018    RESOLVED: Sore lips K13.0   3/12/2018 - 2018    RESOLVED: Other viral warts on left palm  B07.8   2018 - 2020    RESOLVED: Chronic leukopenia D72.819   2018 - 2020    RESOLVED: IFG (impaired fasting glucose) R73.01   10/15/2019 - 2020     Overview Signed 4/1/2020  8:01 AM by Diagnosis Update     IMO load March 2020         RESOLVED: Plantar fasciitis, bilateral M72.2   10/15/2019 - 11/7/2022    Overview Signed 11/11/2020  8:48 AM by Sarah Chao M.D.     S/p steroid injections bilaterally  in 1/2020 with significant improvement of symptoms.   Had repeated injection in 7/2020 by podiatry w/o any relief.          RESOLVED: Breast cyst, right, complex_repeat US in 9/2020.  N60.01   3/16/2020 - 9/28/2020    Overview Addendum 9/28/2020 12:36 PM by Sarah Chao M.D.     Per US done in 3/2020,    Resolved per mammogram and US done in 9/2020.          OAB (overactive bladder) N32.81   11/7/2022 - Present    Overview Signed 5/1/2023 10:28 AM by Sarah Chao M.D.     Used to take Vesicare which helped.   Now controlled without medications           Current Medications            methylPREDNISolone (MEDROL DOSEPAK) 4 MG Tablet Therapy Pack    Collagen-Boron-Hyaluronic Acid (ELVIN TYPE II COLLAGEN/HA & B) 40-5-3.3 MG Tab    Cholecalciferol (VITAMIN D3) 25 MCG (1000 UT) Cap    CALCIUM PO    Ascorbic Acid (VITAMIN C) 100 MG Chew Tab    atorvastatin (LIPITOR) 20 MG Tab    citalopram (CELEXA) 20 MG Tab          Treatment Plan Note     No Program Progress note has been created for the current plan.         Care Plan: MDD       Problem: Major Depressive Disorder       Long-Range Goal: Decrease signs and symptoms of depression       Short-Term Goal: Patient will identify 3 triggers of depression       Intervention: (Clinician) will work with the patient to identify present stressors       Due Date: 7/17/2023    Responsible User: DORI Gomez              Intervention: (Clinician) will work with patient to identify thoughts leading to depression       Due Date: 7/17/2023    Responsible User: DORI Gomez                      Short-Term Goal: Patient will verbalize understanding of symptoms       Intervention: (Clinician) will educate on depression  and depression symptoms       Due Date: 7/17/2023    Responsible User: DORI Gomez                      Short-Term Goal: Patient's support system will participate in treatment       Intervention: (Clinician) will educate family on signs/symptoms of depression       Due Date: 7/17/2023    Responsible User: DORI Gomez              Intervention: (Clinician) will educate family on coping skills       Due Date: 7/17/2023    Responsible User: DORI Gomez              Intervention: (Clinician) will educate family on medication management       Due Date: 7/17/2023    Responsible User: DORI Gomez                              Long-Range Goal: Patient will better manage their depression       Intervention: (Clinician) will encourage group therapy       Due Date: 7/17/2023    Responsible User: DORI Gomez              Intervention: (Clinician) will emphasize the importance of medication adherence       Due Date: 7/17/2023    Responsible User: DORI Gomez              Short-Term Goal: Patient will identify 4+ coping skills       Intervention: (Clinician) will work with patient to identify coping skills and encourage patient to practice daily       Due Date: 7/17/2023    Responsible User: DORI Gomez              Intervention: (Clinician) will help patient practice daily       Due Date: 7/17/2023    Responsible User: DORI Gomez              Intervention: (Clinician) will assist to find potential sources of hope       Due Date: 7/17/2023    Responsible User: DORI Gomez              Intervention: (Clinician) will teach 3 Cognitive Behavioral Therapy skills       Due Date: 7/17/2023    Responsible User: DORI Gomez                      Short-Term Goal: Patient will manage their medication       Intervention: (Clinician) will emphasize the importance of medication adherence       Due Date: 7/17/2023    Responsible User:  DHAVAL GomezFJHONY.                                            Signatures             Patient:  Kena Gomez Abida  Date              Parent / Legal Guardian Signature                    Please Print Name  Relationship to Patient                   Signature  Date

## 2023-07-10 NOTE — PROGRESS NOTES
[facility]   Initial Assessment    Name: Kena Tai  MRN: [id number]  : 1960  Age: 63 y.o.  Date of assessment: 7/10/2023  PCP: Sarah Chao M.D.  Persons in attendance: Patient  Total session time: 55 minutes      CHIEF COMPLAINT AND HISTORY OF PRESENTING PROBLEM:  (as stated by Patient):  Kena Tai is a 63 y.o., White female referred for assessment by Sarah Chao M.D..  Primary presenting issue includes   Chief Complaint   Patient presents with    Initial  Evaluation    Anxiety    Depression   . Patient reports that excessive crying, isolation, thinking about life in general, perfectionism at work,   ..GEMMA-7 Questionnaire    Feeling nervous, anxious, or on edge: Several days  Not being able to sop or control worrying: More than half the days  Worrying too much about different things: More than half the days  Trouble relaxing: Several days  Being so restless that it's hard to sit still: Not at all  Becoming easily annoyed or irritable: More than half the days  Feeling afraid as if something awful might happen: More than half the days  Total: 10    Interpretation of GEMMA 7 Total Score   Score Severity :  0-4 No Anxiety   5-9 Mild Anxiety  10-14 Moderate Anxiety  15-21 Severe Anxiety       ..Depression Screening    Little interest or pleasure in doing things?  3 - nearly every day   Feeling down, depressed , or hopeless? 2 - more than half the days   Trouble falling or staying asleep, or sleeping too much?  0 - not at all   Feeling tired or having little energy?  0 - not at all   Poor appetite or overeating?  0 - not at all   Feeling bad about yourself - or that you are a failure or have let yourself or your family down? 2 - more than half the days   Trouble concentrating on things, such as reading the newspaper or watching television? 2 - more than half the days   Moving or speaking so slowly that other people could have noticed.  Or the opposite - being so fidgety  or restless that you have been moving around a lot more than usual?  0 - not at all   Thoughts that you would be better off dead, or of hurting yourself?  0 - not at all   Patient Health Questionnaire Score: 9       If depressive symptoms identified deferred to follow up visit unless specifically addressed in assesment and plan.    Interpretation of PHQ-9 Total Score   Score Severity   1-4 No Depression   5-9 Mild Depression   10-14 Moderate Depression   15-19 Moderately Severe Depression   20-27 Severe Depression     BEHAVIORAL HEALTH TREATMENT HISTORY  Does patient/parent report a history of prior behavioral health treatment for patient? No:  History of untreated behavioral health issues identified? No  Does patient/parent report change in appetite or weight loss/gain? No  Does patient/parent report physical pain? No              Indicate if pain is acute or chronic, and location: n/a              Pain scale rating:           FAMILY/SOCIAL HISTORY  Current living situation/household members: Patient lives with  of 34 years and son.  Does patient/parent report a family history of behavioral health issues, diagnoses, or treatment?   Family History   Problem Relation Age of Onset    Diabetes Mother     Cancer Father         Prostate Cancer          EMPLOYMENT/RESOURCES  Is the patient currently employed?  Retired   Does the patient/parent report adequate financial resources? Yes       HISTORY:  Does patient report current or past enlistment? No               [If yes, complete below items]  Does patient report history of exposure to combat? No      SPIRITUAL/CULTURAL/IDENTITY:  What are the patient's/family's spiritual beliefs or practices? Quaker but attends [employer]      ABUSE/NEGLECT/TRAUMA SCREENING  Does patient report feeling “unsafe” in his/her home, or afraid of anyone? No  Does patient report any history of physical, sexual, or emotional abuse? No  Is there evidence of neglect by  self? No  Is there evidence of neglect by a caregiver? No                                                                                                          SAFETY ASSESSMENT - SELF  Does patient acknowledge current or past symptoms of dangerousness to self? No  Recent change in frequency/specificity/intensity of suicidal thoughts or self-harm behavior? No  Current access to firearms, medications, or other identified means of suicide/self-harm? No  If yes, willing to restrict access to means of suicide/self-harm? No      Current Suicide Risk: Not applicable  Crisis Safety Plan completed and copy given to patient: No      SAFETY ASSESSMENT - OTHERS  Recent change in frequency/specificity/intensity of thoughts or threats to harm others? No  If Yes:  Current access to firearms/other identified means of harm?   If yes, willing to restrict access to weapons/means of harm?     Current Homicide Risk:  Not applicable  Crisis Safety Plan completed and copy given to patient? No  Based on information provided during the current assessment, is a mandated “duty to warn” being exercised? No      SUBSTANCE USE/ADDICTION HISTORY  Patient denies use of any substance/addictive behaviors Yes    If No:  Is there a family history of substance use/addiction?  Does not like alcohol due to her history, father.  drinks.  Does patient acknowledge or parent/significant other report use of/dependence on substances? Yes  Last time patient used alcohol: 10/2021  Within the past week? No  Last time patient used marijuana: n/a  Within the past month? No  Any other street drugs ever tried even once? No  Any use of prescription medications/pills without a prescription, or for reasons others than originally prescribed?  No  Any other addictive behavior reported (gambling, shopping, sex)? No     Drug History:  Amphetamine:      Cannibis:      Cocaine:      Ecstasy:      Hallucinogen:      Inhalant:       Opiate:      Other:      Sedative:            MENTAL STATUS/OBSERVATIONS              Participation: Active verbal participation, Attentive, and Engaged  Grooming: Casual  Orientation:Alert and Fully Oriented   Behavior: Calm  Eye contact: Good          Mood:Depressed and Anxious  Affect:Flexible and Full range  Thought process: Logical and Goal-directed  Thought content:  Within normal limits  Speech: Rate within normal limits and Volume within normal limits  Perception: Within normal limits  Memory: No gross evidence of memory deficits  Insight: Adequate  Judgment:  Adequate  Other:               Family/couple interaction observations: She reported that she believed that her  was a narcissist and their son is getting diagnosed with Borderline Personality disorder      Patient's motivation/readiness for change: Feel happy, less worries,     Topics addressed in psychotherapy include: Hetal Sagastume Personality test  The 5 love languages quiz  Its not about the nail video    Care plan completed: Yes  Does patient express agreement with the above plan? Yes     Diagnosis:  1. Current moderate episode of major depressive disorder without prior episode (HCC)        Referral appointment(s) scheduled? No       RASHAUN Gomez.

## 2023-08-02 ENCOUNTER — HOSPITAL ENCOUNTER (OUTPATIENT)
Dept: RADIOLOGY | Facility: MEDICAL CENTER | Age: 63
End: 2023-08-02
Attending: FAMILY MEDICINE
Payer: COMMERCIAL

## 2023-08-28 ENCOUNTER — APPOINTMENT (OUTPATIENT)
Dept: RADIOLOGY | Facility: MEDICAL CENTER | Age: 63
End: 2023-08-28
Attending: FAMILY MEDICINE
Payer: COMMERCIAL

## 2023-09-05 ENCOUNTER — TELEPHONE (OUTPATIENT)
Dept: MEDICAL GROUP | Facility: MEDICAL CENTER | Age: 63
End: 2023-09-05
Payer: COMMERCIAL

## 2023-09-05 NOTE — TELEPHONE ENCOUNTER
I am not sure why to find the price information. Please try to contact [place] office for help.   Sarah Chao M.D.

## 2023-10-16 DIAGNOSIS — E78.00 PURE HYPERCHOLESTEROLEMIA: ICD-10-CM

## 2023-10-16 RX ORDER — ATORVASTATIN CALCIUM 20 MG/1
20 TABLET, FILM COATED ORAL
Qty: 90 TABLET | Refills: 1 | Status: SHIPPED | OUTPATIENT
Start: 2023-10-16 | End: 2023-11-10 | Stop reason: SDUPTHER

## 2023-10-31 ENCOUNTER — HOSPITAL ENCOUNTER (OUTPATIENT)
Dept: LAB | Facility: MEDICAL CENTER | Age: 63
End: 2023-10-31
Attending: FAMILY MEDICINE
Payer: COMMERCIAL

## 2023-10-31 DIAGNOSIS — E55.9 VITAMIN D INSUFFICIENCY: ICD-10-CM

## 2023-10-31 DIAGNOSIS — Z00.00 PE (PHYSICAL EXAM), ANNUAL: ICD-10-CM

## 2023-10-31 LAB
25(OH)D3 SERPL-MCNC: 37 NG/ML (ref 30–100)
ALBUMIN SERPL BCP-MCNC: 4.3 G/DL (ref 3.2–4.9)
ALBUMIN/GLOB SERPL: 1.4 G/DL
ALP SERPL-CCNC: 69 U/L (ref 30–99)
ALT SERPL-CCNC: 11 U/L (ref 2–50)
ANION GAP SERPL CALC-SCNC: 8 MMOL/L (ref 7–16)
AST SERPL-CCNC: 22 U/L (ref 12–45)
BASOPHILS # BLD AUTO: 1.5 % (ref 0–1.8)
BASOPHILS # BLD: 0.07 K/UL (ref 0–0.12)
BILIRUB SERPL-MCNC: 2 MG/DL (ref 0.1–1.5)
BUN SERPL-MCNC: 14 MG/DL (ref 8–22)
CALCIUM ALBUM COR SERPL-MCNC: 9.1 MG/DL (ref 8.5–10.5)
CALCIUM SERPL-MCNC: 9.3 MG/DL (ref 8.5–10.5)
CHLORIDE SERPL-SCNC: 104 MMOL/L (ref 96–112)
CHOLEST SERPL-MCNC: 205 MG/DL (ref 100–199)
CO2 SERPL-SCNC: 29 MMOL/L (ref 20–33)
CREAT SERPL-MCNC: 0.75 MG/DL (ref 0.5–1.4)
EOSINOPHIL # BLD AUTO: 0.14 K/UL (ref 0–0.51)
EOSINOPHIL NFR BLD: 3 % (ref 0–6.9)
ERYTHROCYTE [DISTWIDTH] IN BLOOD BY AUTOMATED COUNT: 45.2 FL (ref 35.9–50)
EST. AVERAGE GLUCOSE BLD GHB EST-MCNC: 117 MG/DL
FASTING STATUS PATIENT QL REPORTED: NORMAL
GFR SERPLBLD CREATININE-BSD FMLA CKD-EPI: 89 ML/MIN/1.73 M 2
GLOBULIN SER CALC-MCNC: 3 G/DL (ref 1.9–3.5)
GLUCOSE SERPL-MCNC: 99 MG/DL (ref 65–99)
HBA1C MFR BLD: 5.7 % (ref 4–5.6)
HCT VFR BLD AUTO: 44.1 % (ref 37–47)
HDLC SERPL-MCNC: 67 MG/DL
HGB BLD-MCNC: 13.9 G/DL (ref 12–16)
IMM GRANULOCYTES # BLD AUTO: 0.01 K/UL (ref 0–0.11)
IMM GRANULOCYTES NFR BLD AUTO: 0.2 % (ref 0–0.9)
LDLC SERPL CALC-MCNC: 117 MG/DL
LYMPHOCYTES # BLD AUTO: 1.54 K/UL (ref 1–4.8)
LYMPHOCYTES NFR BLD: 32.7 % (ref 22–41)
MCH RBC QN AUTO: 28.7 PG (ref 27–33)
MCHC RBC AUTO-ENTMCNC: 31.5 G/DL (ref 32.2–35.5)
MCV RBC AUTO: 91.1 FL (ref 81.4–97.8)
MONOCYTES # BLD AUTO: 0.32 K/UL (ref 0–0.85)
MONOCYTES NFR BLD AUTO: 6.8 % (ref 0–13.4)
NEUTROPHILS # BLD AUTO: 2.63 K/UL (ref 1.82–7.42)
NEUTROPHILS NFR BLD: 55.8 % (ref 44–72)
NRBC # BLD AUTO: 0 K/UL
NRBC BLD-RTO: 0 /100 WBC (ref 0–0.2)
PLATELET # BLD AUTO: 245 K/UL (ref 164–446)
PMV BLD AUTO: 10.1 FL (ref 9–12.9)
POTASSIUM SERPL-SCNC: 4.2 MMOL/L (ref 3.6–5.5)
PROT SERPL-MCNC: 7.3 G/DL (ref 6–8.2)
RBC # BLD AUTO: 4.84 M/UL (ref 4.2–5.4)
SODIUM SERPL-SCNC: 141 MMOL/L (ref 135–145)
TRIGL SERPL-MCNC: 104 MG/DL (ref 0–149)
WBC # BLD AUTO: 4.7 K/UL (ref 4.8–10.8)

## 2023-10-31 PROCEDURE — 80061 LIPID PANEL: CPT

## 2023-10-31 PROCEDURE — 36415 COLL VENOUS BLD VENIPUNCTURE: CPT

## 2023-10-31 PROCEDURE — 80053 COMPREHEN METABOLIC PANEL: CPT

## 2023-10-31 PROCEDURE — 82306 VITAMIN D 25 HYDROXY: CPT

## 2023-10-31 PROCEDURE — 83036 HEMOGLOBIN GLYCOSYLATED A1C: CPT

## 2023-10-31 PROCEDURE — 85025 COMPLETE CBC W/AUTO DIFF WBC: CPT

## 2023-11-09 SDOH — HEALTH STABILITY: PHYSICAL HEALTH: ON AVERAGE, HOW MANY MINUTES DO YOU ENGAGE IN EXERCISE AT THIS LEVEL?: 30 MIN

## 2023-11-09 SDOH — ECONOMIC STABILITY: HOUSING INSECURITY

## 2023-11-09 SDOH — ECONOMIC STABILITY: INCOME INSECURITY: IN THE LAST 12 MONTHS, WAS THERE A TIME WHEN YOU WERE NOT ABLE TO PAY THE MORTGAGE OR RENT ON TIME?: NO

## 2023-11-09 SDOH — ECONOMIC STABILITY: FOOD INSECURITY: WITHIN THE PAST 12 MONTHS, YOU WORRIED THAT YOUR FOOD WOULD RUN OUT BEFORE YOU GOT MONEY TO BUY MORE.: NEVER TRUE

## 2023-11-09 SDOH — ECONOMIC STABILITY: FOOD INSECURITY: WITHIN THE PAST 12 MONTHS, THE FOOD YOU BOUGHT JUST DIDN'T LAST AND YOU DIDN'T HAVE MONEY TO GET MORE.: NEVER TRUE

## 2023-11-09 SDOH — ECONOMIC STABILITY: INCOME INSECURITY: HOW HARD IS IT FOR YOU TO PAY FOR THE VERY BASICS LIKE FOOD, HOUSING, MEDICAL CARE, AND HEATING?: NOT HARD AT ALL

## 2023-11-09 SDOH — HEALTH STABILITY: PHYSICAL HEALTH: ON AVERAGE, HOW MANY DAYS PER WEEK DO YOU ENGAGE IN MODERATE TO STRENUOUS EXERCISE (LIKE A BRISK WALK)?: 3 DAYS

## 2023-11-09 ASSESSMENT — SOCIAL DETERMINANTS OF HEALTH (SDOH)
HOW OFTEN DO YOU ATTENT MEETINGS OF THE CLUB OR ORGANIZATION YOU BELONG TO?: MORE THAN 4 TIMES PER YEAR
DO YOU BELONG TO ANY CLUBS OR ORGANIZATIONS SUCH AS CHURCH GROUPS UNIONS, FRATERNAL OR ATHLETIC GROUPS, OR SCHOOL GROUPS?: YES
HOW OFTEN DO YOU ATTEND CHURCH OR RELIGIOUS SERVICES?: MORE THAN 4 TIMES PER YEAR
HOW MANY DRINKS CONTAINING ALCOHOL DO YOU HAVE ON A TYPICAL DAY WHEN YOU ARE DRINKING: PATIENT DOES NOT DRINK
IN A TYPICAL WEEK, HOW MANY TIMES DO YOU TALK ON THE PHONE WITH FAMILY, FRIENDS, OR NEIGHBORS?: MORE THAN THREE TIMES A WEEK
HOW OFTEN DO YOU GET TOGETHER WITH FRIENDS OR RELATIVES?: ONCE A WEEK
HOW OFTEN DO YOU HAVE SIX OR MORE DRINKS ON ONE OCCASION: NEVER
DO YOU BELONG TO ANY CLUBS OR ORGANIZATIONS SUCH AS CHURCH GROUPS UNIONS, FRATERNAL OR ATHLETIC GROUPS, OR SCHOOL GROUPS?: YES
HOW OFTEN DO YOU GET TOGETHER WITH FRIENDS OR RELATIVES?: ONCE A WEEK
WITHIN THE PAST 12 MONTHS, YOU WORRIED THAT YOUR FOOD WOULD RUN OUT BEFORE YOU GOT THE MONEY TO BUY MORE: NEVER TRUE
HOW OFTEN DO YOU ATTENT MEETINGS OF THE CLUB OR ORGANIZATION YOU BELONG TO?: MORE THAN 4 TIMES PER YEAR
IN A TYPICAL WEEK, HOW MANY TIMES DO YOU TALK ON THE PHONE WITH FAMILY, FRIENDS, OR NEIGHBORS?: MORE THAN THREE TIMES A WEEK
HOW HARD IS IT FOR YOU TO PAY FOR THE VERY BASICS LIKE FOOD, HOUSING, MEDICAL CARE, AND HEATING?: NOT HARD AT ALL
HOW OFTEN DO YOU ATTEND CHURCH OR RELIGIOUS SERVICES?: MORE THAN 4 TIMES PER YEAR
HOW OFTEN DO YOU HAVE A DRINK CONTAINING ALCOHOL: NEVER

## 2023-11-09 ASSESSMENT — LIFESTYLE VARIABLES
HOW OFTEN DO YOU HAVE SIX OR MORE DRINKS ON ONE OCCASION: NEVER
HOW MANY STANDARD DRINKS CONTAINING ALCOHOL DO YOU HAVE ON A TYPICAL DAY: PATIENT DOES NOT DRINK
HOW OFTEN DO YOU HAVE A DRINK CONTAINING ALCOHOL: NEVER
SKIP TO QUESTIONS 9-10: 1
AUDIT-C TOTAL SCORE: 0

## 2023-11-10 ENCOUNTER — HOSPITAL ENCOUNTER (OUTPATIENT)
Facility: MEDICAL CENTER | Age: 63
End: 2023-11-10
Attending: FAMILY MEDICINE
Payer: COMMERCIAL

## 2023-11-10 ENCOUNTER — OFFICE VISIT (OUTPATIENT)
Dept: MEDICAL GROUP | Facility: MEDICAL CENTER | Age: 63
End: 2023-11-10
Payer: COMMERCIAL

## 2023-11-10 VITALS
SYSTOLIC BLOOD PRESSURE: 110 MMHG | DIASTOLIC BLOOD PRESSURE: 70 MMHG | TEMPERATURE: 96.6 F | HEART RATE: 68 BPM | OXYGEN SATURATION: 94 % | HEIGHT: 63 IN | WEIGHT: 142.64 LBS | BODY MASS INDEX: 25.27 KG/M2

## 2023-11-10 DIAGNOSIS — N89.8 VAGINAL ODOR: ICD-10-CM

## 2023-11-10 DIAGNOSIS — M85.89 OSTEOPENIA OF MULTIPLE SITES: ICD-10-CM

## 2023-11-10 DIAGNOSIS — Z11.51 SPECIAL SCREENING EXAMINATION FOR HUMAN PAPILLOMAVIRUS (HPV): ICD-10-CM

## 2023-11-10 DIAGNOSIS — R73.03 PREDIABETES: ICD-10-CM

## 2023-11-10 DIAGNOSIS — F32.1 CURRENT MODERATE EPISODE OF MAJOR DEPRESSIVE DISORDER WITHOUT PRIOR EPISODE (HCC): ICD-10-CM

## 2023-11-10 DIAGNOSIS — E78.00 PURE HYPERCHOLESTEROLEMIA: ICD-10-CM

## 2023-11-10 DIAGNOSIS — Z12.4 ROUTINE CERVICAL SMEAR: ICD-10-CM

## 2023-11-10 DIAGNOSIS — Z11.4 ENCOUNTER FOR SCREENING FOR HIV: ICD-10-CM

## 2023-11-10 DIAGNOSIS — Z01.419 WELL WOMAN EXAM: Primary | ICD-10-CM

## 2023-11-10 DIAGNOSIS — Z01.419 ENCOUNTER FOR WELL WOMAN EXAM WITH ROUTINE GYNECOLOGICAL EXAM: ICD-10-CM

## 2023-11-10 LAB
CANDIDA DNA VAG QL PROBE+SIG AMP: NEGATIVE
G VAGINALIS DNA VAG QL PROBE+SIG AMP: NEGATIVE
T VAGINALIS DNA VAG QL PROBE+SIG AMP: NEGATIVE

## 2023-11-10 PROCEDURE — 99214 OFFICE O/P EST MOD 30 MIN: CPT | Mod: 25 | Performed by: FAMILY MEDICINE

## 2023-11-10 PROCEDURE — 3078F DIAST BP <80 MM HG: CPT | Performed by: FAMILY MEDICINE

## 2023-11-10 PROCEDURE — 87510 GARDNER VAG DNA DIR PROBE: CPT

## 2023-11-10 PROCEDURE — 87624 HPV HI-RISK TYP POOLED RSLT: CPT

## 2023-11-10 PROCEDURE — 88175 CYTOPATH C/V AUTO FLUID REDO: CPT

## 2023-11-10 PROCEDURE — 87660 TRICHOMONAS VAGIN DIR PROBE: CPT

## 2023-11-10 PROCEDURE — 3074F SYST BP LT 130 MM HG: CPT | Performed by: FAMILY MEDICINE

## 2023-11-10 PROCEDURE — 87480 CANDIDA DNA DIR PROBE: CPT

## 2023-11-10 PROCEDURE — 87591 N.GONORRHOEAE DNA AMP PROB: CPT

## 2023-11-10 PROCEDURE — 87491 CHLMYD TRACH DNA AMP PROBE: CPT

## 2023-11-10 PROCEDURE — 99396 PREV VISIT EST AGE 40-64: CPT | Performed by: FAMILY MEDICINE

## 2023-11-10 RX ORDER — ATORVASTATIN CALCIUM 20 MG/1
20 TABLET, FILM COATED ORAL
Qty: 90 TABLET | Refills: 1 | Status: SHIPPED | OUTPATIENT
Start: 2023-11-10

## 2023-11-10 ASSESSMENT — FIBROSIS 4 INDEX: FIB4 SCORE: 1.73

## 2023-11-10 NOTE — PROGRESS NOTES
"Subjective:   CC: WWE, pap     HPI:     Kena Tai is a 63 y.o. female, established patient of the clinic.     , not sexually active   Contraception: postmenopause   Hx of STD: negative   Hx of abnormal pap: negative   Denies fever, chills, pelvic pain, dyspareunia, abnormal vaginal bleeding/discharge, genital rash.   Denies nipple bleeding, discharge, breast mass, familial/personal hx of breast/gyn malignancy.   Last mammogram: 2023, Finding: mammogram    She complains of poor odor in the genital area w/o pain, discharge     Patient lost ~20 lbs with intermittent fasting.     Current medicines (including changes today)  Current Outpatient Medications   Medication Sig Dispense Refill    atorvastatin (LIPITOR) 20 MG Tab Take 1 Tablet by mouth at bedtime. 90 Tablet 1    Collagen-Boron-Hyaluronic Acid (ELVIN TYPE II COLLAGEN/HA & B) 40-5-3.3 MG Tab       Cholecalciferol (VITAMIN D3) 25 MCG (1000 UT) Cap       CALCIUM PO       Ascorbic Acid (VITAMIN C) 100 MG Chew Tab       citalopram (CELEXA) 20 MG Tab Take 1 Tablet by mouth every day. 90 Tablet 3     No current facility-administered medications for this visit.     She  has a past medical history of Breast cancer (HCC) and Cancer (HCC) ().    I reviewed patient's problem list, allergies, medications, family hx, social hx with patient and update EPIC.        Objective:     /70 (BP Location: Right arm, Patient Position: Sitting, BP Cuff Size: Adult)   Pulse 68   Temp 35.9 °C (96.6 °F) (Temporal)   Ht 1.6 m (5' 3\")   Wt 64.7 kg (142 lb 10.2 oz)   SpO2 94%  Body mass index is 25.27 kg/m².    Physical Exam:  Constitutional: awake, alert, in no distress.  Skin: Warm, dry, good turgor, no rashes, bruises, ulcers in visible areas.  Eye: conjunctiva clear, lids neg for edema or lesions.  Respiratory: Unlabored respiratory effort, lungs clear to auscultation, no wheezes, no rales.  Cardiovascular: Normal S1, S2, no murmur, no pedal " edema.  Psych: Oriented x3, affect and mood wnl, intact judgement and insight.   GYN: Unremarkable external genitalia. Negative abnormal vaginal discharge or bleeding, no vaginal rash, cervix in mid position, no concerning lesions on cervix, no cervical motion tenderness, uterus mid position with normal size & shape, no adnexal fullness/mass appreciated on bimanual exam.       Assessment and Plan:   The following treatment plan was discussed    1. Special screening examination for human papillomavirus (HPV)  - THINPREP PAP W/HPV AND CTNG    2. Encounter for well woman exam with routine gynecological exam  - THINPREP PAP W/HPV AND CTNG    3. Routine cervical smear  - THINPREP PAP W/HPV AND CTNG    4. Encounter for screening for HIV  - HIV AG/AB COMBO ASSAY SCREENING; Future    5. Well woman exam  Labs per orders  Immunization reviewed and discussed.  Patient was counseled about  diet, supplements, exercises.   Preventive cares reviewed, discussed, and updated as appropriate.       6. Osteopenia of multiple sites  - Vitamin D: 2000 units per day, maintain serum vitamin D level > 30   - Calcium 600 mg BID  - Weight-bearing, balance, resistance exercises   - maintain healthy active lifestyle  - avoid or stop smoking  - Limit alcohol consumption to one drink per day  - pt was counseled on fall prevention    - home fall-proofing information provided.      7. Vaginal odor  - VAGINAL PATHOGENS DNA PANEL; Future    8. Prediabetes  Chronic, recent A1c was 5.7.  Patient lost 20 pounds with intermittent fasting.  Negative familial history of type 2 diabetes.  - Dietary/lifestyle modification and weight loss    - HEMOGLOBIN A1C; Future    9. Pure hypercholesterolemia  Chronic, currently taking atorvastatin 20 mg daily.  She lost 20 pounds with intermittent fasting.  Unfortunately, recent lipid profile showed mildly elevated total cholesterol and LDL.  Patient is motivated to lose more weight with intermittent fasting.  - Lipid  Profile; Future  - HEMOGLOBIN A1C; Future  -Continue atorvastatin (LIPITOR) 20 MG Tab; Take 1 Tablet by mouth at bedtime.  Dispense: 90 Tablet; Refill: 1  - dietary modification, exercise, and weight loss.   - avoid alcohol, drugs, tobacco products     10. Current moderate episode of major depressive disorder without prior episode (HCC)  Chronic, controlled with Celexa 20 mg daily, no s/e reported, will continue.       Sarah Chao M.D.      Followup: Return in about 6 months (around 5/10/2024) for Multiple issues.    Please note that this dictation was created using voice recognition software. I have made every reasonable attempt to correct obvious errors, but I expect that there are errors of grammar and possibly content that I did not discover before finalizing the note.

## 2023-11-15 LAB
C TRACH RRNA CVX QL NAA+PROBE: NEGATIVE
CYTOLOGIST CVX/VAG CYTO: NORMAL
CYTOLOGY CVX/VAG DOC CYTO: NORMAL
CYTOLOGY CVX/VAG DOC THIN PREP: NORMAL
HPV I/H RISK 4 DNA CVX QL PROBE+SIG AMP: NEGATIVE
N GONORRHOEA RRNA CVX QL NAA+PROBE: NEGATIVE
NOTE NL11727A: NORMAL
OTHER STN SPEC: NORMAL
STAT OF ADQ CVX/VAG CYTO-IMP: NORMAL

## 2023-11-20 DIAGNOSIS — F32.1 CURRENT MODERATE EPISODE OF MAJOR DEPRESSIVE DISORDER WITHOUT PRIOR EPISODE (HCC): ICD-10-CM

## 2023-11-20 RX ORDER — CITALOPRAM 20 MG/1
20 TABLET ORAL DAILY
Qty: 100 TABLET | Refills: 0 | Status: SHIPPED | OUTPATIENT
Start: 2023-11-20 | End: 2024-03-23

## 2024-03-20 DIAGNOSIS — F32.1 CURRENT MODERATE EPISODE OF MAJOR DEPRESSIVE DISORDER WITHOUT PRIOR EPISODE (HCC): ICD-10-CM

## 2024-03-23 RX ORDER — CITALOPRAM 20 MG/1
20 TABLET ORAL DAILY
Qty: 90 TABLET | Refills: 1 | Status: SHIPPED | OUTPATIENT
Start: 2024-03-23

## 2024-05-07 ENCOUNTER — PATIENT MESSAGE (OUTPATIENT)
Dept: MEDICAL GROUP | Facility: MEDICAL CENTER | Age: 64
End: 2024-05-07
Payer: COMMERCIAL

## 2024-05-07 DIAGNOSIS — Z78.0 ASYMPTOMATIC POSTMENOPAUSAL STATE: ICD-10-CM

## 2024-06-05 ENCOUNTER — HOSPITAL ENCOUNTER (OUTPATIENT)
Dept: LAB | Facility: MEDICAL CENTER | Age: 64
End: 2024-06-05
Attending: FAMILY MEDICINE
Payer: MEDICARE

## 2024-06-05 DIAGNOSIS — Z11.4 ENCOUNTER FOR SCREENING FOR HIV: ICD-10-CM

## 2024-06-05 DIAGNOSIS — E78.00 PURE HYPERCHOLESTEROLEMIA: ICD-10-CM

## 2024-06-05 DIAGNOSIS — R73.03 PREDIABETES: ICD-10-CM

## 2024-06-05 LAB
CHOLEST SERPL-MCNC: 187 MG/DL (ref 100–199)
EST. AVERAGE GLUCOSE BLD GHB EST-MCNC: 111 MG/DL
HBA1C MFR BLD: 5.5 % (ref 4–5.6)
HDLC SERPL-MCNC: 71 MG/DL
HIV 1+2 AB+HIV1 P24 AG SERPL QL IA: NORMAL
LDLC SERPL CALC-MCNC: 100 MG/DL
TRIGL SERPL-MCNC: 80 MG/DL (ref 0–149)

## 2024-06-05 PROCEDURE — 36415 COLL VENOUS BLD VENIPUNCTURE: CPT

## 2024-06-05 PROCEDURE — 80061 LIPID PANEL: CPT

## 2024-06-05 PROCEDURE — 83036 HEMOGLOBIN GLYCOSYLATED A1C: CPT

## 2024-06-05 PROCEDURE — 87389 HIV-1 AG W/HIV-1&-2 AB AG IA: CPT

## 2024-06-13 ENCOUNTER — APPOINTMENT (OUTPATIENT)
Dept: MEDICAL GROUP | Facility: MEDICAL CENTER | Age: 64
End: 2024-06-13
Payer: COMMERCIAL

## 2024-06-13 VITALS
DIASTOLIC BLOOD PRESSURE: 68 MMHG | HEART RATE: 71 BPM | OXYGEN SATURATION: 95 % | SYSTOLIC BLOOD PRESSURE: 114 MMHG | TEMPERATURE: 97.4 F | BODY MASS INDEX: 25.34 KG/M2 | WEIGHT: 143 LBS | HEIGHT: 63 IN

## 2024-06-13 DIAGNOSIS — M85.89 OSTEOPENIA OF MULTIPLE SITES: ICD-10-CM

## 2024-06-13 DIAGNOSIS — H11.001 PTERYGIUM EYE, RIGHT: ICD-10-CM

## 2024-06-13 DIAGNOSIS — Z00.00 MEDICARE ANNUAL WELLNESS VISIT, INITIAL: Primary | ICD-10-CM

## 2024-06-13 DIAGNOSIS — R73.03 PREDIABETES: ICD-10-CM

## 2024-06-13 DIAGNOSIS — E78.00 PURE HYPERCHOLESTEROLEMIA: ICD-10-CM

## 2024-06-13 DIAGNOSIS — N32.81 OAB (OVERACTIVE BLADDER): ICD-10-CM

## 2024-06-13 DIAGNOSIS — Z23 NEED FOR VACCINATION: ICD-10-CM

## 2024-06-13 DIAGNOSIS — Z12.31 ENCOUNTER FOR SCREENING MAMMOGRAM FOR BREAST CANCER: ICD-10-CM

## 2024-06-13 DIAGNOSIS — F32.1 CURRENT MODERATE EPISODE OF MAJOR DEPRESSIVE DISORDER WITHOUT PRIOR EPISODE (HCC): ICD-10-CM

## 2024-06-13 PROCEDURE — 99214 OFFICE O/P EST MOD 30 MIN: CPT | Mod: 25 | Performed by: FAMILY MEDICINE

## 2024-06-13 PROCEDURE — 90677 PCV20 VACCINE IM: CPT | Performed by: FAMILY MEDICINE

## 2024-06-13 PROCEDURE — G0009 ADMIN PNEUMOCOCCAL VACCINE: HCPCS | Performed by: FAMILY MEDICINE

## 2024-06-13 PROCEDURE — G0438 PPPS, INITIAL VISIT: HCPCS | Performed by: FAMILY MEDICINE

## 2024-06-13 RX ORDER — CITALOPRAM 20 MG/1
20 TABLET ORAL DAILY
Qty: 90 TABLET | Refills: 1 | Status: SHIPPED | OUTPATIENT
Start: 2024-06-13

## 2024-06-13 RX ORDER — ATORVASTATIN CALCIUM 20 MG/1
20 TABLET, FILM COATED ORAL
Qty: 90 TABLET | Refills: 3 | Status: SHIPPED | OUTPATIENT
Start: 2024-06-13

## 2024-06-13 ASSESSMENT — FIBROSIS 4 INDEX: FIB4 SCORE: 1.73

## 2024-06-13 ASSESSMENT — PATIENT HEALTH QUESTIONNAIRE - PHQ9: CLINICAL INTERPRETATION OF PHQ2 SCORE: 0

## 2024-06-13 ASSESSMENT — ACTIVITIES OF DAILY LIVING (ADL): BATHING_REQUIRES_ASSISTANCE: 0

## 2024-06-13 ASSESSMENT — ENCOUNTER SYMPTOMS: GENERAL WELL-BEING: GOOD

## 2024-06-14 NOTE — PROGRESS NOTES
Chief Complaint   Patient presents with    Lab Results    Follow-Up     Six month       HPI:  Kena Tai is a 63 y.o. here for Medicare Annual Wellness Visit     Patient is doing well.  She is taking her medication as directed.  No side effect reported.  All chronic medical conditions are well-controlled.      Patient is planning a trip to [place] in the future.    Patient complains of whitish membrane at the corner of her right eye.  Her hairdresser is a one who first noticed membrane.  Patient denies any acute visual changes, eye pain, loss of vision, eye dryness.    Patient Active Problem List    Diagnosis Date Noted    Osteopenia of multiple sites 11/10/2023    Prediabetes 11/10/2023    OAB (overactive bladder) 11/07/2022    Pure hypercholesterolemia 04/10/2016    Current moderate episode of major depressive disorder without prior episode (HCC) 03/16/2016    History of left breast cancer 10/01/2013       Current Outpatient Medications   Medication Sig Dispense Refill    atorvastatin (LIPITOR) 20 MG Tab Take 1 Tablet by mouth at bedtime. 90 Tablet 3    citalopram (CELEXA) 20 MG Tab Take 1 Tablet by mouth every day. 90 Tablet 1    Collagen-Boron-Hyaluronic Acid (ELVIN TYPE II COLLAGEN/HA & B) 40-5-3.3 MG Tab       Cholecalciferol (VITAMIN D3) 25 MCG (1000 UT) Cap       CALCIUM PO       Ascorbic Acid (VITAMIN C) 100 MG Chew Tab        No current facility-administered medications for this visit.          Current supplements as per medication list.     Allergies: Patient has no known allergies.    Current social contact/activities:travels frequently with friends and family.    She  reports that she has never smoked. She has never used smokeless tobacco. She reports current alcohol use. She reports that she does not use drugs.  Counseling given: Not Answered      ROS:    Gait: Uses no assistive device  Ostomy: No  Other tubes: No  Amputations: No  Chronic oxygen use: No  Last eye exam: 2023  Wears hearing  aids: No   : Denies any urinary leakage during the last 6 months    Screening:  Depression Screening  Little interest or pleasure in doing things?  0 - not at all  Feeling down, depressed , or hopeless? 0 - not at all  Patient Health Questionnaire Score: 0     Screening for Cognitive Impairment  Do you or any of your friends or family members have any concern about your memory? No  Three Minute Recall (Leader, Season, Table) 3/3    John clock face with all 12 numbers and set the hands to show 10 minutes after 11.  Yes    Cognitive concerns identified deferred for follow up unless specifically addressed in assessment and plan.    Fall Risk Assessment  Has the patient had two or more falls in the last year or any fall with injury in the last year?  No    Safety Assessment  Do you always wear your seatbelt?  Yes  Any changes to home needed to function safely? No  Difficulty hearing.  No  Patient counseled about all safety risks that were identified.    Functional Assessment ADLs  Are there any barriers preventing you from cooking for yourself or meeting nutritional needs?  No.    Are there any barriers preventing you from driving safely or obtaining transportation?  No.    Are there any barriers preventing you from using a telephone or calling for help?  No    Are there any barriers preventing you from shopping?  No.    Are there any barriers preventing you from taking care of your own finances?  No    Are there any barriers preventing you from managing your medications?  No    Are there any barriers preventing you from showering, bathing or dressing yourself? No    Are there any barriers preventing you from doing housework or laundry? No  Are there any barriers preventing you from using the toilet?No  Are you currently engaging in any exercise or physical activity?  Yes.      Self-Assessment of Health  What is your perception of your health? Good  Do you sleep more than six hours a night? Yes  In the past 7 days,  how much did pain keep you from doing your normal work? None  Do you spend quality time with family or friends (virtually or in person)? Yes  Do you usually eat a heart healthy diet that constists of a variety of fruits, vegetables, whole grains and fiber? Yes  Do you eat foods high in fat and/or Fast Food more than three times per week? No    Advance Care Planning  Do you have an Advance Directive, Living Will, Durable Power of , or POLST?                   Health Maintenance Summary            Overdue - COVID-19 Vaccine (4 - 2023-24 season) Overdue since 9/1/2023 11/11/2022  Imm Admin: MODERNA BIVALENT BOOSTER SARS-COV-2 VACCINE (6+)    12/21/2021  Imm Admin: MODERNA SARS-COV-2 VACCINE (12+)    01/26/2021  Imm Admin: MODERNA SARS-COV-2 VACCINE (12+)    12/29/2020  Imm Admin: MODERNA SARS-COV-2 VACCINE (12+)              Ordered - Mammogram (Yearly) Ordered on 6/13/2024 09/14/2023  MA-SCREENING MAMMO BILAT W/TOMOSYNTHESIS W/CAD    08/02/2022  MA DIAGNOSTIC MAMMO BILAT W/CAD    08/02/2022  [id number]    08/27/2021  MA-SCREENING MAMMO BILAT W/TOMOSYNTHESIS W/CAD    09/28/2020  MA-DIAGNOSTIC MAMMO BILAT W/TOMOSYNTHESIS W/CAD    Only the first 5 history entries have been loaded, but more history exists.              Annual Wellness Visit (Yearly) Next due on 6/13/2025 06/13/2024  Visit Dx: Medicare annual wellness visit, initial              IMM DTaP/Tdap/Td Vaccine (2 - Td or Tdap) Next due on 3/12/2028      03/12/2018  Imm Admin: Tdap Vaccine              Cervical Cancer Screening (Every 5 Years) Next due on 11/10/2028      11/10/2023  THINPREP PAP W/HPV AND CTNG    11/06/2019  Pathology Gynecology Specimen    11/06/2019  THINPREP PAP WITH HPV    04/26/2016  THINPREP PAP WITH HPV    04/26/2016  PATHOLOGY GYN SPECIMEN    Only the first 5 history entries have been loaded, but more history exists.              Colorectal Cancer Screening (Colonoscopy - Preferred) Next due on 5/13/2031       05/13/2021  REFERRAL TO GI FOR COLONOSCOPY    01/26/2011  REFERRAL TO GI FOR COLONOSCOPY              Hepatitis C Screening  Completed      01/06/2020  Hepatitis C Antibody component of HEP C VIRUS ANTIBODY              Zoster (Shingles) Vaccines (Series Information) Completed      03/04/2022  Imm Admin: Zoster Vaccine Recombinant (RZV) (SHINGRIX)    12/21/2021  Imm Admin: Zoster Vaccine Recombinant (RZV) (SHINGRIX)              Influenza Vaccine (Series Information) Completed      09/28/2023  Imm Admin: Influenza Vaccine Quad Inj (Pf)    11/07/2022  Imm Admin: Influenza Vaccine Quad Inj (Pf)    10/13/2021  Imm Admin: Influenza Vaccine Quad Inj (Pf)    09/23/2020  Imm Admin: Influenza (IM) Preservative Free - HISTORICAL DATA    10/08/2019  Imm Admin: Influenza Vaccine Quad Inj (Pf)    Only the first 5 history entries have been loaded, but more history exists.              Pneumococcal Vaccine: 0-64 Years (Series Information) Aged Out      06/13/2024  Imm Admin: Pneumococcal Conjugate Vaccine (PCV20)              Hepatitis A Vaccine (Hep A) (Series Information) Aged Out      No completion history exists for this topic.              HPV Vaccines (Series Information) Aged Out      No completion history exists for this topic.              Polio Vaccine (Inactivated Polio) (Series Information) Aged Out      No completion history exists for this topic.              Meningococcal Immunization (Series Information) Aged Out      No completion history exists for this topic.              Discontinued - Hepatitis B Vaccine (Hep B)  Discontinued      No completion history exists for this topic.                    Patient Care Team:  Sarah Chao M.D. as PCP - General (Family Medicine)  Sharon Nagy M.D. as Consulting Physician (Medical Oncology)  Pj Mo M.D. (Gastroenterology)        Social History     Tobacco Use    Smoking status: Never    Smokeless tobacco: Never   Vaping Use    Vaping status: Never Used  "  Substance Use Topics    Alcohol use: Yes     Comment: social    Drug use: No     Family History   Problem Relation Age of Onset    Diabetes Mother     Cancer Father         Prostate Cancer     She  has a past medical history of Breast cancer (HCC) and Cancer (HCC) (2001).   Past Surgical History:   Procedure Laterality Date    BREAST BIOPSY  10/1/2013    Performed by Jeana Hoover M.D. at [facility]ERY [facility]    BREAST BIOPSY  9/23/2013    Performed by Jeana Hoover M.D. at SURGERY [facility]    ENDOMETRIAL ABLATION      GYN SURGERY      endometrial ablation    LUMPECTOMY      OTHER      tonsillectomy    RI RADIATION THERAPY PLAN SIMPLE      US-NEEDLE CORE BX-BREAST PANEL         Exam:   /68 (BP Location: Right arm, Patient Position: Sitting, BP Cuff Size: Adult)   Pulse 71   Temp 36.3 °C (97.4 °F) (Temporal)   Ht 1.6 m (5' 3\")   Wt 64.9 kg (143 lb)   SpO2 95%  Body mass index is 25.33 kg/m².    Hearing excellent.    Dentition good  Alert, oriented in no acute distress.  Eye contact is good, speech goal directed, affect calm  - whitish, thin membrane noted at the medial corner of patient's right eye.   - intact EOM and visual field.   Cardiovascular exam: within normal limits.     Assessment and Plan. The following treatment and monitoring plan is recommended:      1. Prediabetes  Improving per recent labs.  Patient has been working on dietary and lifestyle modification to control his condition.  Will continue to monitor.  - HEMOGLOBIN A1C; Future    2. Pure hypercholesterolemia  Chronic, controlled with Lipitor 20 mg daily-, no s/e reported, will continue.    - atorvastatin (LIPITOR) 20 MG Tab; Take 1 Tablet by mouth at bedtime.  Dispense: 90 Tablet; Refill: 3  - CBC WITH DIFFERENTIAL; Future  - Comp Metabolic Panel; Future  - Lipid Profile; Future    3. Current moderate episode of major depressive disorder without prior episode (HCC)  Chronic, controlled with Celexa 20 mg qd, no s/e " reported, will continue.    - citalopram (CELEXA) 20 MG Tab; Take 1 Tablet by mouth every day.  Dispense: 90 Tablet; Refill: 1    4. OAB (overactive bladder)  Mild, doing well w/o medications, will continue to monitor.   She used to take Vesicare but discontinued due to side effects.   Discussed other available treatment in case she requires treatment in the future.     5. Osteopenia of multiple sites  - Vitamin D: 2000 units per day, maintain serum vitamin D level > 30   - Calcium 600 mg BID  - Weight-bearing, balance, resistance exercises   - maintain healthy active lifestyle  - avoid or stop smoking  - Limit alcohol consumption to one drink per day  - pt was counseled on fall prevention    - home fall-proofing information provided.      6. Encounter for screening mammogram for breast cancer  - MA-SCREENING MAMMO BILAT W/TOMOSYNTHESIS W/CAD; Future    7. Need for vaccination  - Pneumococcal Conjugate Vaccine 20-Valent    8. Medicare annual wellness visit, initial  Labs per orders  Immunization reviewed and discussed.  Patient was counseled about  diet, supplements, exercises.   Preventive cares reviewed, discussed, and updated as appropriate.       9. Pterygium eye, right  History and exam are concerning for mild right pterygium.   Patient is asymptomatic. Pathogenesis discussed.   Will continue to monitor.      Services suggested: No services needed at this time  Health Care Screening: Age-appropriate preventive services recommended by USPTF and ACIP covered by Medicare were discussed today. Services ordered if indicated and agreed upon by the patient.  Referrals offered: Community-based lifestyle interventions to reduce health risks and promote self-management and wellness, fall prevention, nutrition, physical activity, tobacco-use cessation, weight loss, and mental health services as per orders if indicated.    Discussion today about general wellness and lifestyle habits:    Prevent falls and reduce trip  hazards; Cautioned about securing or removing rugs.  Have a working fire alarm and carbon monoxide detector;   Engage in regular physical activity and social activities     Follow-up: Return in about 6 months (around 12/13/2024) for Multiple issues.

## 2024-08-26 ENCOUNTER — APPOINTMENT (OUTPATIENT)
Dept: RADIOLOGY | Facility: MEDICAL CENTER | Age: 64
End: 2024-08-26
Attending: FAMILY MEDICINE
Payer: MEDICARE

## 2024-08-26 DIAGNOSIS — Z12.31 ENCOUNTER FOR SCREENING MAMMOGRAM FOR BREAST CANCER: ICD-10-CM

## 2024-08-26 PROCEDURE — 77067 SCR MAMMO BI INCL CAD: CPT

## 2024-09-04 ENCOUNTER — PATIENT MESSAGE (OUTPATIENT)
Dept: HEALTH INFORMATION MANAGEMENT | Facility: OTHER | Age: 64
End: 2024-09-04

## 2025-01-24 NOTE — ASSESSMENT & PLAN NOTE
Chronic, stable. Maintains on statin therapy without issue. Most recent lipid panel from 6/20024 WNL. Continue atorvastatin 20mg daily. Recommend Mediterranean diet and regular physical activity. Follow up with PCP at least annually for continued monitoring and management.   Lab Results   Component Value Date/Time    CHOLSTRLTOT 187 06/05/2024 08:09 AM     (H) 06/05/2024 08:09 AM    HDL 71 06/05/2024 08:09 AM    TRIGLYCERIDE 80 06/05/2024 08:09 AM

## 2025-01-24 NOTE — ASSESSMENT & PLAN NOTE
Chronic, stable. PHQ today 0/2. Pt maintains on citalopram 20mg daily which works well. She questions if she continues to need this. She recalls prior employment lead to depression which resolved with care home three years ago. She finds santo in her children's successes and has great social support from her [employer]. Follow up with PCP at least annually for continued monitoring and management.

## 2025-01-24 NOTE — ASSESSMENT & PLAN NOTE
Chronic, stable. Last DEXA 2023 with osteopenia of the lumbar spine and left hip with T scores of -1.8 and -1.4 respectively. Denies hx of fragility fracture. Advise calcium and vitamin D supplementation with weightbearing exercises as tolerated. Discussed fall risk reduction. Follow up with PCP at least annually for continued monitoring and management.

## 2025-01-27 ASSESSMENT — ENCOUNTER SYMPTOMS: GENERAL WELL-BEING: GOOD

## 2025-01-27 ASSESSMENT — PATIENT HEALTH QUESTIONNAIRE - PHQ9
2. FEELING DOWN, DEPRESSED, IRRITABLE, OR HOPELESS: NOT AT ALL
1. LITTLE INTEREST OR PLEASURE IN DOING THINGS: NOT AT ALL

## 2025-01-27 ASSESSMENT — ACTIVITIES OF DAILY LIVING (ADL): BATHING_REQUIRES_ASSISTANCE: 0

## 2025-01-28 ENCOUNTER — OFFICE VISIT (OUTPATIENT)
Dept: FAMILY PLANNING/WOMEN'S HEALTH CLINIC | Facility: PHYSICIAN GROUP | Age: 65
End: 2025-01-28
Attending: FAMILY MEDICINE
Payer: MEDICARE

## 2025-01-28 VITALS
RESPIRATION RATE: 16 BRPM | HEIGHT: 64 IN | WEIGHT: 149 LBS | BODY MASS INDEX: 25.44 KG/M2 | SYSTOLIC BLOOD PRESSURE: 100 MMHG | OXYGEN SATURATION: 94 % | DIASTOLIC BLOOD PRESSURE: 64 MMHG | HEART RATE: 70 BPM

## 2025-01-28 DIAGNOSIS — R29.6 FREQUENT FALLS: ICD-10-CM

## 2025-01-28 DIAGNOSIS — E78.00 PURE HYPERCHOLESTEROLEMIA: ICD-10-CM

## 2025-01-28 DIAGNOSIS — N32.81 OAB (OVERACTIVE BLADDER): ICD-10-CM

## 2025-01-28 DIAGNOSIS — F32.9 CURRENT EPISODE OF MAJOR DEPRESSIVE DISORDER WITHOUT PRIOR EPISODE, UNSPECIFIED DEPRESSION EPISODE SEVERITY: ICD-10-CM

## 2025-01-28 DIAGNOSIS — M85.89 OSTEOPENIA OF MULTIPLE SITES: ICD-10-CM

## 2025-01-28 PROCEDURE — G0438 PPPS, INITIAL VISIT: HCPCS | Performed by: PHYSICIAN ASSISTANT

## 2025-01-28 PROCEDURE — 1126F AMNT PAIN NOTED NONE PRSNT: CPT | Performed by: PHYSICIAN ASSISTANT

## 2025-01-28 PROCEDURE — 3074F SYST BP LT 130 MM HG: CPT | Performed by: PHYSICIAN ASSISTANT

## 2025-01-28 PROCEDURE — 3078F DIAST BP <80 MM HG: CPT | Performed by: PHYSICIAN ASSISTANT

## 2025-01-28 SDOH — ECONOMIC STABILITY: HOUSING INSECURITY: AT ANY TIME IN THE PAST 12 MONTHS, WERE YOU HOMELESS OR LIVING IN A SHELTER (INCLUDING NOW)?: NO

## 2025-01-28 SDOH — HEALTH STABILITY: PHYSICAL HEALTH: ON AVERAGE, HOW MANY DAYS PER WEEK DO YOU ENGAGE IN MODERATE TO STRENUOUS EXERCISE (LIKE A BRISK WALK)?: 2 DAYS

## 2025-01-28 SDOH — HEALTH STABILITY: PHYSICAL HEALTH: ON AVERAGE, HOW MANY MINUTES DO YOU ENGAGE IN EXERCISE AT THIS LEVEL?: 30 MIN

## 2025-01-28 SDOH — ECONOMIC STABILITY: FOOD INSECURITY: HOW HARD IS IT FOR YOU TO PAY FOR THE VERY BASICS LIKE FOOD, HOUSING, MEDICAL CARE, AND HEATING?: NOT VERY HARD

## 2025-01-28 SDOH — ECONOMIC STABILITY: FOOD INSECURITY: WITHIN THE PAST 12 MONTHS, YOU WORRIED THAT YOUR FOOD WOULD RUN OUT BEFORE YOU GOT THE MONEY TO BUY MORE.: NEVER TRUE

## 2025-01-28 SDOH — ECONOMIC STABILITY: HOUSING INSECURITY: IN THE LAST 12 MONTHS, WAS THERE A TIME WHEN YOU WERE NOT ABLE TO PAY THE MORTGAGE OR RENT ON TIME?: NO

## 2025-01-28 SDOH — ECONOMIC STABILITY: FOOD INSECURITY: WITHIN THE PAST 12 MONTHS, THE FOOD YOU BOUGHT JUST DIDN'T LAST AND YOU DIDN'T HAVE MONEY TO GET MORE.: NEVER TRUE

## 2025-01-28 SDOH — ECONOMIC STABILITY: TRANSPORTATION INSECURITY: IN THE PAST 12 MONTHS, HAS LACK OF TRANSPORTATION KEPT YOU FROM MEDICAL APPOINTMENTS OR FROM GETTING MEDICATIONS?: NO

## 2025-01-28 ASSESSMENT — PATIENT HEALTH QUESTIONNAIRE - PHQ9
7. TROUBLE CONCENTRATING ON THINGS, SUCH AS READING THE NEWSPAPER OR WATCHING TELEVISION: NOT AT ALL
5. POOR APPETITE OR OVEREATING: NOT AT ALL
SUM OF ALL RESPONSES TO PHQ9 QUESTIONS 1 AND 2: 0
6. FEELING BAD ABOUT YOURSELF - OR THAT YOU ARE A FAILURE OR HAVE LET YOURSELF OR YOUR FAMILY DOWN: NOT AL ALL
CLINICAL INTERPRETATION OF PHQ2 SCORE: 0
9. THOUGHTS THAT YOU WOULD BE BETTER OFF DEAD, OR OF HURTING YOURSELF: NOT AT ALL
2. FEELING DOWN, DEPRESSED, IRRITABLE, OR HOPELESS: NOT AT ALL
SUM OF ALL RESPONSES TO PHQ QUESTIONS 1-9: 0
1. LITTLE INTEREST OR PLEASURE IN DOING THINGS: NOT AT ALL
3. TROUBLE FALLING OR STAYING ASLEEP OR SLEEPING TOO MUCH: NOT AT ALL
4. FEELING TIRED OR HAVING LITTLE ENERGY: NOT AT ALL
8. MOVING OR SPEAKING SO SLOWLY THAT OTHER PEOPLE COULD HAVE NOTICED. OR THE OPPOSITE, BEING SO FIGETY OR RESTLESS THAT YOU HAVE BEEN MOVING AROUND A LOT MORE THAN USUAL: NOT AT ALL

## 2025-01-28 ASSESSMENT — PAIN SCALES - GENERAL: PAINLEVEL_OUTOF10: NO PAIN

## 2025-01-28 ASSESSMENT — ACTIVITIES OF DAILY LIVING (ADL): LACK_OF_TRANSPORTATION: NO

## 2025-01-28 ASSESSMENT — FIBROSIS 4 INDEX: FIB4 SCORE: 1.76

## 2025-01-28 NOTE — ASSESSMENT & PLAN NOTE
Pt reports a long history of frequent falls spanning her lifetime. She estimates 10 falls over the last year, fortunately without any significant injury. She attributes falls to clumsiness and not picking up her feet. Discussed fall risk reduction and importance of maintaining leg strength and balance. She expressed interest in PT to address this deficit which is certainly reasonable. Order placed.

## 2025-01-28 NOTE — PROGRESS NOTES
Comprehensive Health Assessment Program     Kena Tai is a 64 y.o. here for her comprehensive health assessment.    Patient Active Problem List    Diagnosis Date Noted    Frequent falls 01/28/2025    Pterygium eye, right 06/13/2024    Osteopenia of multiple sites 11/10/2023    Prediabetes 11/10/2023    OAB (overactive bladder) 11/07/2022    Pure hypercholesterolemia 04/10/2016    Current episode of major depressive disorder without prior episode 03/16/2016    History of left breast cancer 10/01/2013       Current Outpatient Medications   Medication Sig Dispense Refill    citalopram (CELEXA) 20 MG Tab TAKE ONE TABLET BY MOUTH ONE TIME DAILY 90 Tablet 0    atorvastatin (LIPITOR) 20 MG Tab Take 1 Tablet by mouth at bedtime. 90 Tablet 3    Cholecalciferol (VITAMIN D3) 25 MCG (1000 UT) Cap       CALCIUM PO       Ascorbic Acid (VITAMIN C) 100 MG Chew Tab       Collagen-Boron-Hyaluronic Acid (ELVIN TYPE II COLLAGEN/HA & B) 40-5-3.3 MG Tab        No current facility-administered medications for this visit.          Current supplements as per medication list.     Allergies:   Patient has no known allergies.  Social History     Tobacco Use    Smoking status: Never    Smokeless tobacco: Never   Vaping Use    Vaping status: Never Used   Substance Use Topics    Alcohol use: Yes     Comment: social    Drug use: No     Family History   Problem Relation Age of Onset    Diabetes Mother     Cancer Father         Prostate Cancer     Kena  has a past medical history of Breast cancer (HCC) and Cancer (HCC) (2001).   Past Surgical History:   Procedure Laterality Date    BREAST BIOPSY  10/1/2013    Performed by Jeana Hoover M.D. at SURGERY SAME DAY [facility] ORS    BREAST BIOPSY  9/23/2013    Performed by Jeana Hoover M.D. at SURGERY [facility] ORS    ENDOMETRIAL ABLATION      GYN SURGERY      endometrial ablation    LUMPECTOMY      OTHER      tonsillectomy    [place] RADIATION THERAPY PLAN SIMPLE      US-NEEDLE CORE  BX-BREAST PANEL         Screening:  In the last six months have you experienced any leakage of urine? No    Depression Screening  Little interest or pleasure in doing things?  0 - not at all  Feeling down, depressed , or hopeless? 0 - not at all  Patient Health Questionnaire Score: 0     If depressive symptoms identified deferred to follow up visit unless specifically addressed in assessment and plan.    Interpretation of PHQ-9 Total Score   Score Severity   1-4 No Depression   5-9 Mild Depression   10-14 Moderate Depression   15-19 Moderately Severe Depression   20-27 Severe Depression    Screening for Cognitive Impairment  Do you or any of your friends or family members have any concern about your memory? Yes, short term memory  Three Minute Recall (Leader, Season, Table) 3/3    John clock face with all 12 numbers and set the hands to show 10 minutes after 11.  Yes 5  Cognitive concerns identified deferred for follow up unless specifically addressed in assessment and plan.    Fall Risk Assessment  Has the patient had two or more falls in the last year or any fall with injury in the last year?  Yes    Safety Assessment  Do you always wear your seatbelt?  Yes  Any changes to home needed to function safely? No  Difficulty hearing.  No  Patient counseled about all safety risks that were identified.    Functional Assessment ADLs  Are there any barriers preventing you from cooking for yourself or meeting nutritional needs?  No.    Are there any barriers preventing you from driving safely or obtaining transportation?  No.    Are there any barriers preventing you from using a telephone or calling for help?  No    Are there any barriers preventing you from shopping?  No.    Are there any barriers preventing you from taking care of your own finances?  No    Are there any barriers preventing you from managing your medications?  No    Are there any barriers preventing you from showering, bathing or dressing yourself? No     Are there any barriers preventing you from doing housework or laundry? No  Are there any barriers preventing you from using the toilet?No  Are you currently engaging in any exercise or physical activity?  No.      Self-Assessment of Health  What is your perception of your health? Good    Do you sleep more than six hours a night? No    In the past 7 days, how much did pain keep you from doing your normal work? None    Do you spend quality time with family or friends (virtually or in person)? Yes    Do you usually eat a heart healthy diet that constists of a variety of fruits, vegetables, whole grains and fiber? Yes    Do you eat foods high in fat and/or Fast Food more than three times per week? No    How concerned are you that your medical conditions are not being well managed? a little    Are you worried that in the next 2 months, you may not have stable housing that you own, rent, or stay in as part of a household? No      Advance Care Planning  Do you have an Advance Directive, Living Will, Durable Power of , or POLST? No                 Health Maintenance Summary            Overdue - COVID-19 Vaccine (4 - 2024-25 season) Overdue since 9/1/2024 11/11/2022  Imm Admin: MODERNA BIVALENT BOOSTER SARS-COV-2 VACCINE (6+)    12/21/2021  Imm Admin: MODERNA SARS-COV-2 VACCINE (12+)    01/26/2021  Imm Admin: MODERNA SARS-COV-2 VACCINE (12+)    12/29/2020  Imm Admin: MODERNA SARS-COV-2 VACCINE (12+)              Mammogram (Yearly) Next due on 8/26/2025 08/26/2024  MA-SCREENING MAMMO BILAT W/TOMOSYNTHESIS W/CAD    09/14/2023  MA-SCREENING MAMMO BILAT W/TOMOSYNTHESIS W/CAD    08/02/2022  MA DIAGNOSTIC MAMMO BILAT W/CAD    08/02/2022  [id number]    08/27/2021  MA-SCREENING MAMMO BILAT W/TOMOSYNTHESIS W/CAD    Only the first 5 history entries have been loaded, but more history exists.              Annual Wellness Visit (Yearly) Next due on 1/28/2026 01/28/2025  Level of Service: IN INITIAL  ANNUAL WELLNESS VISIT-INCLUDES PPPS    06/13/2024  Visit Dx: Medicare annual wellness visit, initial    06/13/2024  Level of Service: MO INITIAL ANNUAL WELLNESS VISIT-INCLUDES PPPS              IMM DTaP/Tdap/Td Vaccine (2 - Td or Tdap) Next due on 3/12/2028      03/12/2018  Imm Admin: Tdap Vaccine              Bone Density Scan (Every 5 Years) Tentatively due on 9/14/2028 09/14/2023  DS-BONE DENSITY STUDY (DEXA)    05/31/2017  DS-BONE DENSITY STUDY (DEXA)              Cervical Cancer Screening (Every 5 Years) Next due on 11/10/2028      11/10/2023  THINPREP PAP W/HPV AND CTNG    11/06/2019  Pathology Gynecology Specimen    11/06/2019  THINPREP PAP WITH HPV    04/26/2016  THINPREP PAP WITH HPV    04/26/2016  PATHOLOGY GYN SPECIMEN    Only the first 5 history entries have been loaded, but more history exists.              Colorectal Cancer Screening (Colonoscopy - Preferred) Next due on 5/13/2031 05/13/2021  REFERRAL TO GI FOR COLONOSCOPY    01/26/2011  REFERRAL TO GI FOR COLONOSCOPY              Hepatitis C Screening  Completed      01/06/2020  Hepatitis C Antibody component of HEP C VIRUS ANTIBODY              Zoster (Shingles) Vaccines (Series Information) Completed      03/04/2022  Imm Admin: Zoster Vaccine Recombinant (RZV) (SHINGRIX)    12/21/2021  Imm Admin: Zoster Vaccine Recombinant (RZV) (SHINGRIX)              Pneumococcal Vaccine: 65+ Years (Series Information) Completed      06/13/2024  Imm Admin: Pneumococcal Conjugate Vaccine (PCV20)              Influenza Vaccine (Series Information) Completed      10/13/2024  Imm Admin: Influenza split virus trivalent (PF)    09/28/2023  Imm Admin: Influenza Vaccine Quad Inj (Pf)    11/07/2022  Imm Admin: Influenza Vaccine Quad Inj (Pf)    10/13/2021  Imm Admin: Influenza Vaccine Quad Inj (Pf)    09/23/2020  Imm Admin: Influenza (IM) Preservative Free - HISTORICAL DATA    Only the first 5 history entries have been loaded, but more history exists.          "     Hepatitis A Vaccine (Hep A) (Series Information) Aged Out      No completion history exists for this topic.              HPV Vaccines (Series Information) Aged Out      No completion history exists for this topic.              Polio Vaccine (Inactivated Polio) (Series Information) Aged Out      No completion history exists for this topic.              Meningococcal Immunization (Series Information) Aged Out      No completion history exists for this topic.              Discontinued - Hepatitis B Vaccine (Hep B)  Discontinued      No completion history exists for this topic.                    Patient Care Team:  Sarah Chao M.D. as PCP - General (Family Medicine)  Sharon Nagy M.D. as Consulting Physician (Medical Oncology)  Pj Mo M.D. (Gastroenterology)      Financial Resource Strain: Low Risk  (1/28/2025)    Overall Financial Resource Strain (CARDIA)     Difficulty of Paying Living Expenses: Not very hard      Transportation Needs: No Transportation Needs (1/28/2025)    PRAPARE - Transportation     Lack of Transportation (Medical): No     Lack of Transportation (Non-Medical): No      Food Insecurity: No Food Insecurity (1/28/2025)    Hunger Vital Sign     Worried About Running Out of Food in the Last Year: Never true     Ran Out of Food in the Last Year: Never true        Encounter Vitals  Blood Pressure : 100/64  Pulse: 70  Respiration: 16  Pulse Oximetry: 94 %  Weight: 67.6 kg (149 lb)  Height: 162.6 cm (5' 4\") (PT stated)  BMI (Calculated): 25.58  Pain Score: No pain     Physical Exam:  Constitutional: NAD  HENMT: NC/AT, EOMI  Cardiovascular: RRR, No m/r/g  Lungs: CTAB, no w/r/r  Extremities: No c/c/e  Skin: No lesions notes  Neurologic: Alert & oriented x3, CN II-XII grossly intact    Assessment and Plan. The following treatment and monitoring plan is recommended:  Pure hypercholesterolemia  Chronic, stable. Maintains on statin therapy without issue. Most recent lipid panel from 6/20024 " WNL. Continue atorvastatin 20mg daily. Recommend Mediterranean diet and regular physical activity. Follow up with PCP at least annually for continued monitoring and management.   Lab Results   Component Value Date/Time    CHOLSTRLTOT 187 06/05/2024 08:09 AM     (H) 06/05/2024 08:09 AM    HDL 71 06/05/2024 08:09 AM    TRIGLYCERIDE 80 06/05/2024 08:09 AM       Current episode of major depressive disorder without prior episode  Chronic, stable. PHQ today 0/2. Pt maintains on citalopram 20mg daily which works well. She questions if she continues to need this. She recalls prior employment lead to depression which resolved with care home three years ago. She finds santo in her children's successes and has great social support from her Taoism. Follow up with PCP at least annually for continued monitoring and management.    Osteopenia of multiple sites  Chronic, stable. Last DEXA 2023 with osteopenia of the lumbar spine and left hip with T scores of -1.8 and -1.4 respectively. Denies hx of fragility fracture. Advise calcium and vitamin D supplementation with weightbearing exercises as tolerated. Discussed fall risk reduction. Follow up with PCP at least annually for continued monitoring and management.    OAB (overactive bladder)  Chronic, ongoing. Pt complains of nocturia. She is unable to limit liquid consumption. Follow up with PCP.     Frequent falls  Pt reports a long history of frequent falls spanning her lifetime. She estimates 10 falls over the last year, fortunately without any significant injury. She attributes falls to clumsiness and not picking up her feet. Discussed fall risk reduction and importance of maintaining leg strength and balance. She expressed interest in PT to address this deficit which is certainly reasonable. Order placed.    Services suggested: No services needed at this time  Health Care Screening: Age-appropriate preventive services recommended by USPTF and ACIP covered by Medicare were  discussed today. Services ordered if indicated and agreed upon by the patient.  Referrals offered: Community-based lifestyle interventions to reduce health risks and promote self-management and wellness, fall prevention, nutrition, physical activity, tobacco-use cessation, weight loss, and mental health services as per orders if indicated.    Discussion today about general wellness and lifestyle habits:    Prevent falls and reduce trip hazards; Cautioned about securing or removing rugs.  Have a working fire alarm and carbon monoxide detector.  Engage in regular physical activity and social activities.    Follow-up: No follow-ups on file.

## 2025-01-28 NOTE — ASSESSMENT & PLAN NOTE
Chronic, ongoing. Pt complains of nocturia. She is unable to limit liquid consumption. Follow up with PCP.

## 2025-02-17 ENCOUNTER — PHYSICAL THERAPY (OUTPATIENT)
Dept: PHYSICAL THERAPY | Facility: MEDICAL CENTER | Age: 65
End: 2025-02-17
Attending: PHYSICIAN ASSISTANT
Payer: MEDICARE

## 2025-02-17 DIAGNOSIS — R29.6 FREQUENT FALLS: ICD-10-CM

## 2025-02-17 PROCEDURE — 97112 NEUROMUSCULAR REEDUCATION: CPT

## 2025-02-17 PROCEDURE — 97162 PT EVAL MOD COMPLEX 30 MIN: CPT

## 2025-02-17 NOTE — OP THERAPY EVALUATION
Outpatient Physical Therapy  EVALUATION    [facility]  [address]  [place] [address]  Phone:  [phone]  Fax:  [phone]    Date of Evaluation: 02/17/2025    Patient: Kena Tai  YOB: 1960  MRN: [id number]     Referring Provider: Linnea Pace P.A.-C.  [address] [place] [address]   Referring Diagnosis: Frequent falls [R29.6]     Time Calculation    Start time: 0846  Stop time: 0930 Time Calculation (min): 44 minutes           Chief Complaint: Loss Of Balance    Visit Diagnoses     ICD-10-CM   1. Frequent falls  R29.6         History of Present Illness:     Mechanism of injury:    Kena presents to PT for support in improving her balance as was discussed at her recent annual review with her PCP. Kena reports a long history of frequent falls spanning her lifetime. She estimates 10 falls over the last year, fortunately without any significant injury. She attributes falls to clumsiness and not picking up her feet. These falls typically happen while in the community or on vacation; often on uneven ground. She does find herself tripping on her last stair at home frequently. She denies dizziness, sense of syncope or contributory pain. Denies N/T in the LEs.     Prior level of function:  Retired. Hobbies: macrame, crafting, cooking, [employer], travel. Does not exercise. Does not use any ADs.    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 at SURGERY SAME DAY [facility] ORS    BREAST BIOPSY  9/23/2013    Performed by Jeana Hoover M.D. at SURGERY [facility]S    ENDOMETRIAL ABLATION      GYN SURGERY      endometrial ablation    LUMPECTOMY      OTHER      tonsillectomy    [place] RADIATION THERAPY PLAN SIMPLE      US-NEEDLE CORE BX-BREAST PANEL       Social History     Tobacco Use    Smoking status: Never    Smokeless tobacco: Never   Substance Use Topics    Alcohol use: Yes     Comment: social     Family and Occupational History     Socioeconomic History    Marital status:      Spouse name: Not on file    Number of children: Not on file    Years of education: Not on file    Highest education level: Associate degree: academic program   Occupational History    Not on file       Objective:    Vestibulospinal Exam:     Comments:   MiniBest:   STS= 2  Rise to toes= 1  Stand on 1 leg= 2  Fwd stepping correction= 1  Backward stepping correction= 1  Lateral stepping correction= 2  EC, feet together, firm= 2  Foam EC, feet together= 2  Incline EC= 2  Change in gait speed= 2  Walk with HT= 2  Walk with pivot turn= 2  Step over obstacle= 2  TUG= 2 TUG with dual task= (5 seconds standard and 7 seconds with COG)    Total= 25/28    Oculomotor Exam:         Details: No spontaneous nystagmus central gaze          Details: No spontaneous nystagmus eccentric gaze    No saccadic eye movements    Smooth pursuit present    Convergence:        Normal convergence  Limb Ataxia Exam:     Finger-to-Nose:         Intention tremor: none    Dysdiadochokinesia: none.  Strength Exam:     Comments:   30\" STS= 15 reps    Lunge: challenging and unstable with L LE leading.   Reflex Exam:       Deep Tendon Reflexes:         Left L4 (Patellar): normal (2+)        Right L4 (Patellar): normal (2+)  Vestibulo-Ocular Exam:   Normal head thrust test  BPPV Exam:     Normal [place]-Hallpike    Normal straight head-hanging test    Normal roll test        Therapeutic Treatments and " "Modalities:     1. Neuromuscular Re-education (CPT 78161), Education regarding eval outcomes, components of good balance, importance of regular exercise, HEP established with return demo.    Therapeutic Treatment and Modalities Summary:   Access Code: [email or web address]  URL: [email or web address]  Date: 02/17/2025  Prepared by:     Exercises  - Standing Heel Raise  - 5 x weekly - 2 sets - 15 reps - 3 second hold  - Sit to Stand Without Arm Support  - 5 x weekly - 2 sets - 15 reps  - Reverse Lunge  - 5 x weekly - 2 sets - 10 reps  - Body [place] Sway  - 5 x weekly - 60 seconds hold    Time-based treatments/modalities:    Physical Therapy Timed Treatment Charges  Neuromusc re-ed, balance, coor, post minutes (CPT 43835): 15 minutes        Assessment:     Functional impairments:  Gait abnormality/instability and decreased range of motion/strength    Assessment details:    Kena is a 64 y.o female who presents to PT with complaint of imbalance and a positive fall history for at least 10 in the last year (no injury). PT evaluation does show reduced strength in the lateral hips for functional lunging, reduced strength in the ankle plantarflexors and reduced reliability of step recovery strategies anterior and posterior. There may also be a component of situational awareness contributory to the falls as well. Given the initial findings, skilled PT services are indicated to address the mentioned impairments, support resolution of functional limitations, reduce risk of falls and enhance QOL.       Prognosis: good    Goals:     Short term goals:    - Improve heel raise to 3\" holds without LOB  - Able to lunge to the floor and return without use of UEs.     Short term goal time span:  1-2 weeks    Long term goals:    - Improved mini best to 28/28  - Pt demonstrates independence with a HEP for continued management of this problem beyond discharge from therapy.       Long term goal time span:  6-8 weeks  Plan:     Therapy " options:  Physical therapy treatment to continue    Planned therapy interventions:  Functional Training, Self Care (CPT 92828), Therapeutic Activities (CPT 11963), Therapeutic Exercise (CPT 98620), Gait Training (CPT 35002), Neuromuscular Re-education (CPT 73005) and Manual Therapy (CPT 89125)    Frequency:  1x week    Duration in weeks:  8    Discussed with:  Patient      Functional Assessment Used    ABC= 78%      Referring provider co-signature:  I have reviewed this plan of care and my co-signature certifies the need for services.    Certification Period: 02/17/2025 to  04/14/25    Physician Signature: ________________________________ Date: ______________

## 2025-02-21 NOTE — OP THERAPY DAILY TREATMENT
Outpatient Physical Therapy  DAILY TREATMENT     [facility]  [address]  [place] [address]  Phone:  [phone]  Fax:  [phone]    Date: 02/24/2025    Patient: Kena Tai  YOB: 1960  MRN: [id number]     Time Calculation    Start time: 0845  Stop time: 0932 Time Calculation (min): 47 minutes         Chief Complaint: Difficulty Walking    Visit #: 2    SUBJECTIVE:  I've been doing well with my HEP, but I have been needing to hold on with UEs for lunges.     OBJECTIVE:      Therapeutic Exercises (CPT 03699):     1. NuStep, L4 x 5 min    2. Marching bridge, 2x10 ea    3. Supine 90/90 taps, 2x10 ea    4. SL hip abd, x 15    5. SL hip circles, CW x 20    6. Goblet STS, 2x15, 10# KB    Therapeutic Treatments and Modalities:     1. Neuromuscular Re-education (CPT 08699)    Therapeutic Treatment and Modalities Summary:   - Ankle sways: AP and Circular with EO/EC x 1 min ea  - Near tandem EC x 1 min ea. With horizontal HTs and EC x 10 ea  - Reverse lunge: NMRE working on just the step and balance to start and slowing increasing depth. X 2 min ea      Access Code: [email or web address]  URL: [email or web address]  Date: 02/17/2025  Prepared by:     Exercises  - Standing Heel Raise  - 5 x weekly - 2 sets - 15 reps - 3 second hold  - Sit to Stand Without Arm Support  - 5 x weekly - 2 sets - 15 reps  - Reverse Lunge  - 5 x weekly - 2 sets - 10 reps  - Body [place] Sway  - 5 x weekly - 60 seconds hold    Time-based treatments/modalities:    Physical Therapy Timed Treatment Charges  Neuromusc re-ed, balance, coor, post minutes (CPT 15241): 15 minutes  Therapeutic exercise minutes (CPT 82272): 30 minutes    ASSESSMENT:   Response to treatment: Appropriate fatigue reported in the posterior chain after the above tx. Pt was able to remove UE assist for lunges in 1/2 range. Encouraged this modification for home, eventually working  up to full range.     PLAN/RECOMMENDATIONS:   Plan for treatment: therapy treatment to continue next visit.  Planned interventions for next visit: continue with current treatment.

## 2025-02-24 ENCOUNTER — PHYSICAL THERAPY (OUTPATIENT)
Dept: PHYSICAL THERAPY | Facility: MEDICAL CENTER | Age: 65
End: 2025-02-24
Attending: PHYSICIAN ASSISTANT
Payer: MEDICARE

## 2025-02-24 DIAGNOSIS — R29.6 FREQUENT FALLS: ICD-10-CM

## 2025-02-24 PROCEDURE — 97112 NEUROMUSCULAR REEDUCATION: CPT

## 2025-02-24 PROCEDURE — 97110 THERAPEUTIC EXERCISES: CPT

## 2025-03-03 ENCOUNTER — APPOINTMENT (OUTPATIENT)
Dept: PHYSICAL THERAPY | Facility: MEDICAL CENTER | Age: 65
End: 2025-03-03
Attending: PHYSICIAN ASSISTANT
Payer: MEDICARE

## 2025-03-07 NOTE — OP THERAPY DAILY TREATMENT
"  Outpatient Physical Therapy  DAILY TREATMENT     [facility] Outpatient Physical Therapy  [address]  [place] [address]  Phone:  [phone]  Fax:  [phone]    Date: 03/10/2025    Patient: Kena Tai  YOB: 1960  MRN: [id number]     Time Calculation    Start time: 1022  Stop time: 1100 Time Calculation (min): 38 minutes         Chief Complaint: Loss Of Balance    Visit #: 3    SUBJECTIVE:  I went to the swimming pool and I loved it. I haven't looked at their gym yet, but planning to. I am working on my lunges, but I am not there yet.     OBJECTIVE:    + R leslie-hallpike, latent 3\" and duration 12\" R upward and torsional.     Therapeutic Exercises (CPT 67478):     1. NuStep, L4 x 5 min    2. Marching bridge, x 20 ea    3. Supine 90/90 LE ext, x 20 ea    4. Side plank, knee hold x 30\", from knee with hip abd x 10 ea, (reported vertigo when turning to L side support)    Therapeutic Treatments and Modalities:     1. Canalith Repositioning (CPT 20153), R Epley completed x 2. Nystagmus resolved for B leslie-hallpike and roll after 2nd CRM.    2. Self Care ADL Training (CPT 19703), Education regarding BPPV, goals of CRMs to correct the problem, post tx pxns, HOs provided to enhance self management of this problem.    Therapeutic Treatment and Modalities Summary:   Access Code: [email or web address]  URL: [email or web address]  Date: 02/17/2025  Prepared by:     Exercises  - Standing Heel Raise  - 5 x weekly - 2 sets - 15 reps - 3 second hold  - Sit to Stand Without Arm Support  - 5 x weekly - 2 sets - 15 reps  - Reverse Lunge  - 5 x weekly - 2 sets - 10 reps  - Body [place] Sway  - 5 x weekly - 60 seconds hold    Time-based treatments/modalities:    Physical Therapy Timed Treatment Charges  Functional training, self care minutes (CPT 81947): 15 minutes  Therapeutic exercise minutes (CPT 63369): 10 minutes    ASSESSMENT:   Response to treatment: Pt found " to be positive for R PC BPPV today, likely contributing to her increased fall hx. CRM well tolerated and appeared to clear the problem. Recheck next visit. Resume general strength and balance training next visit.     PLAN/RECOMMENDATIONS:   Plan for treatment: therapy treatment to continue next visit.  Planned interventions for next visit: continue with current treatment.

## 2025-03-10 ENCOUNTER — PHYSICAL THERAPY (OUTPATIENT)
Dept: PHYSICAL THERAPY | Facility: MEDICAL CENTER | Age: 65
End: 2025-03-10
Attending: PHYSICIAN ASSISTANT
Payer: MEDICARE

## 2025-03-10 DIAGNOSIS — R29.6 FREQUENT FALLS: ICD-10-CM

## 2025-03-10 PROCEDURE — 97535 SELF CARE MNGMENT TRAINING: CPT | Mod: KX

## 2025-03-10 PROCEDURE — 97110 THERAPEUTIC EXERCISES: CPT

## 2025-03-14 NOTE — OP THERAPY DAILY TREATMENT
"  Outpatient Physical Therapy  DAILY TREATMENT     [facility] Outpatient Physical Therapy  [address]  [place] [address]  Phone:  [phone]  Fax:  [phone]    Date: 03/17/2025    Patient: Kena Tai  YOB: 1960  MRN: [id number]     Time Calculation    Start time: 1329  Stop time: 1409 Time Calculation (min): 40 minutes         Chief Complaint: Loss Of Balance and Vertigo    Visit #: 4    SUBJECTIVE:  I have not had any vertigo since last visit. I have been swimming, but I've only done \"a little\" of my HEP.     OBJECTIVE:    NEG for B leslie-hallpike and roll.     Therapeutic Treatments and Modalities:     1. Neuromuscular Re-education (CPT 15915)    Therapeutic Treatment and Modalities Summary:   BPPV re-test.    30\" STS= 16 reps    MiniBest:   STS= 2  Rise to toes= 2  Stand on 1 leg= 2  Fwd stepping correction= 2  Backward stepping correction= 2  Lateral stepping correction= 2  EC, feet together, firm= 2  Foam EC, feet together= 2  Incline EC= 2  Change in gait speed= 2  Walk with HT= 2  Walk with pivot turn= 2  Step over obstacle= 2  TUG= 2 TUG with dual task= (5 seconds standard and 7 seconds with COG)     Total= 28/28    Functional Gait Analysis  1) Gait level surface: 3  2) Change in gait speed: 3  3) Gait with horizontal head turns: 3  4) Gait with vertical head turns: 3  5) Gait and pivot turn: 3  6) Step over obstacle: 3  7) Gait with narrow JOSE: 2  8) Gait with eyes closed: 3  9) Ambulating backwards: 3  10) Steps: 2    Total= 28/30    Access Code: [email or web address]  URL: [email or web address]  Date: 03/17/2025  Prepared by:     Exercises  - Standing Heel Raise  - 5 x weekly - 2 sets - 15 reps - 3 second hold  - Sit to Stand Without Arm Support  - 5 x weekly - 2 sets - 15 reps  - Reverse Lunge  - 5 x weekly - 2 sets - 10 reps  - Tandem Walking  - 5 x weekly - 20 reps  - Body [place] Sway  - 5 x weekly - 60 seconds " hold    Time-based treatments/modalities:    Physical Therapy Timed Treatment Charges  Neuromusc re-ed, balance, coor, post minutes (CPT 38607): 40 minutes    ASSESSMENT:   Response to treatment: See discharge    PLAN/RECOMMENDATIONS:   Plan for treatment: discharge.

## 2025-03-17 ENCOUNTER — PHYSICAL THERAPY (OUTPATIENT)
Dept: PHYSICAL THERAPY | Facility: MEDICAL CENTER | Age: 65
End: 2025-03-17
Attending: PHYSICIAN ASSISTANT
Payer: MEDICARE

## 2025-03-17 DIAGNOSIS — R29.6 FREQUENT FALLS: ICD-10-CM

## 2025-03-17 PROCEDURE — 97112 NEUROMUSCULAR REEDUCATION: CPT

## 2025-03-17 NOTE — OP THERAPY DISCHARGE SUMMARY
Outpatient Physical Therapy  DISCHARGE SUMMARY NOTE      [facility] Outpatient Physical Therapy  [address]  [place] [address]  Phone:  [phone]  Fax:  [phone]    Date of Visit: 03/17/2025    Patient: Kena Tai  YOB: 1960  MRN: [id number]     Referring Provider: Linnea Pace P.A.-C.  [address] [place] [address]   Referring Diagnosis Repeated falls [R29.6]         Functional Assessment Used        Your patient is being discharged from Physical Therapy with the following comments:   Goals met    Comments:  Kena was seen for 4 PT sessions supporting the management of her imbalance with history of falling. Kena was found to be positive for R PC BPPV, which resolved with use of the R [place]. She also had reduced lateral hip strength for sufficient step recovery and floor transfers. Kena showed good motivation with subsequent VRT and balance training to target these deficits. Re-assessment shows perfect score on the miniBEST and FGA= 28/30. Measures indicate low risk of falls.       Recommendations:  Considering all goals have been met and pt is showing good independence with her HEP, recommending discharge to [facility] as there are no further skilled needs at present time. Welcome to return with a new referral if there is change in status.     Morelia Rubin, PT, DPT    Date: 3/17/2025

## 2025-03-24 ENCOUNTER — APPOINTMENT (OUTPATIENT)
Dept: PHYSICAL THERAPY | Facility: MEDICAL CENTER | Age: 65
End: 2025-03-24
Attending: PHYSICIAN ASSISTANT
Payer: MEDICARE

## 2025-04-20 DIAGNOSIS — F32.1 CURRENT MODERATE EPISODE OF MAJOR DEPRESSIVE DISORDER WITHOUT PRIOR EPISODE (HCC): ICD-10-CM

## 2025-04-21 RX ORDER — CITALOPRAM HYDROBROMIDE 20 MG/1
20 TABLET ORAL DAILY
Qty: 90 TABLET | Refills: 1 | Status: SHIPPED | OUTPATIENT
Start: 2025-04-21

## 2025-04-21 NOTE — TELEPHONE ENCOUNTER
Received request via: Pharmacy    Was the patient seen in the last year in this department? Yes    Does the patient have an active prescription (recently filled or refills available) for medication(s) requested? No    Pharmacy Name:     Does the patient have skilled nursing Plus and need 100-day supply? (This applies to ALL medications) Yes, quantity updated to 100 days

## 2025-08-04 DIAGNOSIS — E78.00 PURE HYPERCHOLESTEROLEMIA: ICD-10-CM

## 2025-08-04 RX ORDER — ATORVASTATIN CALCIUM 20 MG/1
20 TABLET, FILM COATED ORAL
Qty: 100 TABLET | Refills: 0 | Status: SHIPPED | OUTPATIENT
Start: 2025-08-04

## 2025-08-21 ENCOUNTER — OFFICE VISIT (OUTPATIENT)
Dept: MEDICAL GROUP | Facility: MEDICAL CENTER | Age: 65
End: 2025-08-21
Payer: MEDICARE

## 2025-08-21 VITALS
DIASTOLIC BLOOD PRESSURE: 64 MMHG | SYSTOLIC BLOOD PRESSURE: 108 MMHG | OXYGEN SATURATION: 97 % | WEIGHT: 154.32 LBS | TEMPERATURE: 96.7 F | HEART RATE: 75 BPM | HEIGHT: 64 IN | BODY MASS INDEX: 26.35 KG/M2

## 2025-08-21 DIAGNOSIS — R73.03 PREDIABETES: ICD-10-CM

## 2025-08-21 DIAGNOSIS — H81.10 BENIGN PAROXYSMAL POSITIONAL VERTIGO, UNSPECIFIED LATERALITY: ICD-10-CM

## 2025-08-21 DIAGNOSIS — H25.813 COMBINED FORMS OF AGE-RELATED CATARACT OF BOTH EYES: ICD-10-CM

## 2025-08-21 DIAGNOSIS — E78.00 PURE HYPERCHOLESTEROLEMIA: Primary | ICD-10-CM

## 2025-08-21 DIAGNOSIS — F32.9 CURRENT EPISODE OF MAJOR DEPRESSIVE DISORDER WITHOUT PRIOR EPISODE, UNSPECIFIED DEPRESSION EPISODE SEVERITY: ICD-10-CM

## 2025-08-21 DIAGNOSIS — M85.89 OSTEOPENIA OF MULTIPLE SITES: ICD-10-CM

## 2025-08-21 DIAGNOSIS — Z00.00 PE (PHYSICAL EXAM), ANNUAL: ICD-10-CM

## 2025-08-21 PROBLEM — N32.81 OAB (OVERACTIVE BLADDER): Status: RESOLVED | Noted: 2022-11-07 | Resolved: 2025-08-21

## 2025-08-21 PROBLEM — R29.6 FREQUENT FALLS: Status: RESOLVED | Noted: 2025-01-28 | Resolved: 2025-08-21

## 2025-08-21 PROBLEM — H11.001 PTERYGIUM EYE, RIGHT: Status: RESOLVED | Noted: 2024-06-13 | Resolved: 2025-08-21

## 2025-08-21 PROCEDURE — 99397 PER PM REEVAL EST PAT 65+ YR: CPT | Performed by: FAMILY MEDICINE

## 2025-08-21 PROCEDURE — G2211 COMPLEX E/M VISIT ADD ON: HCPCS | Performed by: FAMILY MEDICINE

## 2025-08-21 PROCEDURE — 3074F SYST BP LT 130 MM HG: CPT | Performed by: FAMILY MEDICINE

## 2025-08-21 PROCEDURE — 99214 OFFICE O/P EST MOD 30 MIN: CPT | Mod: 25 | Performed by: FAMILY MEDICINE

## 2025-08-21 PROCEDURE — 3078F DIAST BP <80 MM HG: CPT | Performed by: FAMILY MEDICINE

## 2025-08-21 RX ORDER — MECLIZINE HYDROCHLORIDE 25 MG/1
25 TABLET ORAL NIGHTLY
Qty: 30 TABLET | Refills: 0 | Status: SHIPPED | OUTPATIENT
Start: 2025-08-21

## 2025-08-21 ASSESSMENT — FIBROSIS 4 INDEX: FIB4 SCORE: 1.79

## 2025-09-18 ENCOUNTER — APPOINTMENT (OUTPATIENT)
Dept: RADIOLOGY | Facility: MEDICAL CENTER | Age: 65
End: 2025-09-18
Attending: FAMILY MEDICINE
Payer: MEDICARE

## 2025-09-18 DIAGNOSIS — Z12.31 VISIT FOR SCREENING MAMMOGRAM: ICD-10-CM

## 2025-09-23 ENCOUNTER — APPOINTMENT (OUTPATIENT)
Dept: MEDICAL GROUP | Facility: MEDICAL CENTER | Age: 65
End: 2025-09-23
Payer: MEDICARE

## 2025-10-30 ENCOUNTER — APPOINTMENT (OUTPATIENT)
Dept: MEDICAL GROUP | Facility: MEDICAL CENTER | Age: 65
End: 2025-10-30
Payer: MEDICARE